# Patient Record
Sex: FEMALE | Race: WHITE | Employment: UNEMPLOYED | URBAN - METROPOLITAN AREA
[De-identification: names, ages, dates, MRNs, and addresses within clinical notes are randomized per-mention and may not be internally consistent; named-entity substitution may affect disease eponyms.]

---

## 2021-09-26 ENCOUNTER — HOSPITAL ENCOUNTER (EMERGENCY)
Age: 26
Discharge: HOME | End: 2021-09-26
Attending: EMERGENCY MEDICINE
Payer: COMMERCIAL

## 2021-09-26 ENCOUNTER — APPOINTMENT (OUTPATIENT)
Dept: GENERAL RADIOLOGY | Age: 26
End: 2021-09-26
Payer: COMMERCIAL

## 2021-09-26 VITALS
HEIGHT: 59 IN | WEIGHT: 293 LBS | RESPIRATION RATE: 20 BRPM | BODY MASS INDEX: 59.07 KG/M2 | TEMPERATURE: 97.6 F | SYSTOLIC BLOOD PRESSURE: 138 MMHG | OXYGEN SATURATION: 96 % | HEART RATE: 98 BPM | DIASTOLIC BLOOD PRESSURE: 92 MMHG

## 2021-09-26 DIAGNOSIS — J45.901 MILD ASTHMA WITH EXACERBATION, UNSPECIFIED WHETHER PERSISTENT: Primary | ICD-10-CM

## 2021-09-26 LAB
ANION GAP SERPL CALCULATED.3IONS-SCNC: 17 MMOL/L (ref 4–16)
BASE EXCESS MIXED: 0.2 (ref 0–2.3)
BASE EXCESS: ABNORMAL (ref 0–2.4)
BASOPHILS ABSOLUTE: 0.1 K/CU MM
BASOPHILS RELATIVE PERCENT: 0.6 % (ref 0–1)
BUN BLDV-MCNC: 19 MG/DL (ref 6–23)
CALCIUM SERPL-MCNC: 9.4 MG/DL (ref 8.3–10.6)
CHLORIDE BLD-SCNC: 104 MMOL/L (ref 99–110)
CO2 CONTENT: 27.6 MMOL/L (ref 19–24)
CO2: 20 MMOL/L (ref 21–32)
CREAT SERPL-MCNC: 0.8 MG/DL (ref 0.6–1.1)
DIFFERENTIAL TYPE: ABNORMAL
EOSINOPHILS ABSOLUTE: 0.5 K/CU MM
EOSINOPHILS RELATIVE PERCENT: 6 % (ref 0–3)
GFR AFRICAN AMERICAN: >60 ML/MIN/1.73M2
GFR NON-AFRICAN AMERICAN: >60 ML/MIN/1.73M2
GLUCOSE BLD-MCNC: 100 MG/DL (ref 70–99)
HCO3 VENOUS: 26.2 MMOL/L (ref 19–25)
HCT VFR BLD CALC: 34.7 % (ref 37–47)
HEMOGLOBIN: 10.7 GM/DL (ref 12.5–16)
IMMATURE NEUTROPHIL %: 0.3 % (ref 0–0.43)
LYMPHOCYTES ABSOLUTE: 2.1 K/CU MM
LYMPHOCYTES RELATIVE PERCENT: 23.3 % (ref 24–44)
MCH RBC QN AUTO: 23.5 PG (ref 27–31)
MCHC RBC AUTO-ENTMCNC: 30.8 % (ref 32–36)
MCV RBC AUTO: 76.1 FL (ref 78–100)
MONOCYTES ABSOLUTE: 0.5 K/CU MM
MONOCYTES RELATIVE PERCENT: 5.2 % (ref 0–4)
O2 SAT, VEN: 47.3 % (ref 50–70)
PCO2, VEN: 47 MMHG (ref 38–52)
PDW BLD-RTO: 14.7 % (ref 11.7–14.9)
PH VENOUS: 7.35 (ref 7.32–7.42)
PLATELET # BLD: 293 K/CU MM (ref 140–440)
PMV BLD AUTO: 11 FL (ref 7.5–11.1)
PO2, VEN: 27.3 MMHG (ref 28–48)
POTASSIUM SERPL-SCNC: 3.7 MMOL/L (ref 3.5–5.1)
RBC # BLD: 4.56 M/CU MM (ref 4.2–5.4)
SEGMENTED NEUTROPHILS ABSOLUTE COUNT: 5.7 K/CU MM
SEGMENTED NEUTROPHILS RELATIVE PERCENT: 64.6 % (ref 36–66)
SODIUM BLD-SCNC: 141 MMOL/L (ref 135–145)
SOURCE, BLOOD GAS: ABNORMAL
TOTAL IMMATURE NEUTOROPHIL: 0.03 K/CU MM
WBC # BLD: 8.8 K/CU MM (ref 4–10.5)

## 2021-09-26 PROCEDURE — 6360000002 HC RX W HCPCS: Performed by: EMERGENCY MEDICINE

## 2021-09-26 PROCEDURE — 96375 TX/PRO/DX INJ NEW DRUG ADDON: CPT

## 2021-09-26 PROCEDURE — 93005 ELECTROCARDIOGRAM TRACING: CPT | Performed by: EMERGENCY MEDICINE

## 2021-09-26 PROCEDURE — 85025 COMPLETE CBC W/AUTO DIFF WBC: CPT

## 2021-09-26 PROCEDURE — 99283 EMERGENCY DEPT VISIT LOW MDM: CPT

## 2021-09-26 PROCEDURE — 96374 THER/PROPH/DIAG INJ IV PUSH: CPT

## 2021-09-26 PROCEDURE — 71045 X-RAY EXAM CHEST 1 VIEW: CPT

## 2021-09-26 PROCEDURE — 6370000000 HC RX 637 (ALT 250 FOR IP): Performed by: EMERGENCY MEDICINE

## 2021-09-26 PROCEDURE — 80048 BASIC METABOLIC PNL TOTAL CA: CPT

## 2021-09-26 RX ORDER — METHYLPREDNISOLONE SODIUM SUCCINATE 125 MG/2ML
125 INJECTION, POWDER, LYOPHILIZED, FOR SOLUTION INTRAMUSCULAR; INTRAVENOUS ONCE
Status: COMPLETED | OUTPATIENT
Start: 2021-09-26 | End: 2021-09-26

## 2021-09-26 RX ORDER — IPRATROPIUM BROMIDE AND ALBUTEROL SULFATE 2.5; .5 MG/3ML; MG/3ML
3 SOLUTION RESPIRATORY (INHALATION) ONCE
Status: COMPLETED | OUTPATIENT
Start: 2021-09-26 | End: 2021-09-26

## 2021-09-26 RX ORDER — ALBUTEROL SULFATE 2.5 MG/3ML
2.5 SOLUTION RESPIRATORY (INHALATION) EVERY 4 HOURS PRN
Qty: 120 EACH | Refills: 0 | Status: SHIPPED | OUTPATIENT
Start: 2021-09-26 | End: 2021-11-21 | Stop reason: SDUPTHER

## 2021-09-26 RX ORDER — PREDNISONE 50 MG/1
50 TABLET ORAL DAILY
Qty: 5 TABLET | Refills: 0 | Status: SHIPPED | OUTPATIENT
Start: 2021-09-26 | End: 2021-10-01

## 2021-09-26 RX ORDER — KETOROLAC TROMETHAMINE 30 MG/ML
15 INJECTION, SOLUTION INTRAMUSCULAR; INTRAVENOUS ONCE
Status: COMPLETED | OUTPATIENT
Start: 2021-09-26 | End: 2021-09-26

## 2021-09-26 RX ADMIN — KETOROLAC TROMETHAMINE 15 MG: 30 INJECTION, SOLUTION INTRAMUSCULAR; INTRAVENOUS at 20:08

## 2021-09-26 RX ADMIN — IPRATROPIUM BROMIDE AND ALBUTEROL SULFATE 3 AMPULE: .5; 3 SOLUTION RESPIRATORY (INHALATION) at 20:13

## 2021-09-26 RX ADMIN — METHYLPREDNISOLONE SODIUM SUCCINATE 125 MG: 125 INJECTION, POWDER, FOR SOLUTION INTRAMUSCULAR; INTRAVENOUS at 20:07

## 2021-09-26 ASSESSMENT — ENCOUNTER SYMPTOMS
SINUS PRESSURE: 0
SHORTNESS OF BREATH: 1
COUGH: 1
NAUSEA: 0
WHEEZING: 1
VOMITING: 0
RHINORRHEA: 0
CONSTIPATION: 0
ABDOMINAL PAIN: 0
SORE THROAT: 0
DIARRHEA: 0

## 2021-09-26 ASSESSMENT — PAIN SCALES - GENERAL: PAINLEVEL_OUTOF10: 1

## 2021-09-26 NOTE — ED NOTES
Pt presents to ED with c/o shortness of breath that started around noon today. Pt states she was cleaning with bleach and pine sol and started to become short of breath. Pt states she has a hx of asthma and used her inhaler several times today with temporary relief.       Sarmad Perales RN  09/26/21 1931

## 2021-09-27 LAB
EKG ATRIAL RATE: 95 BPM
EKG DIAGNOSIS: NORMAL
EKG P AXIS: 54 DEGREES
EKG P-R INTERVAL: 164 MS
EKG Q-T INTERVAL: 358 MS
EKG QRS DURATION: 70 MS
EKG QTC CALCULATION (BAZETT): 449 MS
EKG R AXIS: 83 DEGREES
EKG T AXIS: 60 DEGREES
EKG VENTRICULAR RATE: 95 BPM

## 2021-09-27 PROCEDURE — 93010 ELECTROCARDIOGRAM REPORT: CPT | Performed by: INTERNAL MEDICINE

## 2021-09-27 NOTE — ED PROVIDER NOTES
Emergency Department Encounter    Patient: Dahlia Nayak  MRN: [id number]  : 1995  Date of Evaluation: 2021  ED Provider:  Roger Wilkes DO    Triage Chief Complaint:   Shortness of Breath    HPI:  Dahlia Nayak is a 27 y.o. female with past medical history significant for asthma who presents with shortness of breath. Patient states that she had a  leak in her home, today she was cleaning up after this and began having shortness of breath shortly after using cleaning chemicals. She stated that she used diluted bleach and Pine-Sol in a room with a window open, she did not wear a mask while cleaning. Patient states she began having wheezing, and difficulty breathing that has gotten progressively worse throughout today. She does note that is associated with a nonproductive cough. She has tried her albuterol inhaler without improvement of symptoms. Denies F/C, CP, SOB, palpitations, N/V, abdominal pain, dysuria, diarrhea and constipatin. ROS:  Review of Systems   Constitutional: Negative for chills and fever. HENT: Negative for congestion, rhinorrhea, sinus pressure and sore throat. Eyes: Negative for visual disturbance. Respiratory: Positive for cough, shortness of breath and wheezing. Cardiovascular: Negative for chest pain and palpitations. Gastrointestinal: Negative for abdominal pain, constipation, diarrhea, nausea and vomiting. Genitourinary: Negative for dysuria, frequency and urgency. Musculoskeletal: Negative for arthralgias and myalgias. Skin: Negative for rash. Neurological: Negative for dizziness and syncope. Psychiatric/Behavioral: Negative for agitation, behavioral problems and confusion. Past Medical History:   Diagnosis Date    Asthma      No past surgical history on file. No family history on file.   Social History     Socioeconomic History    Marital status: Single     Spouse name: Not on file    Number of children: Not on file    Years of education: Not on file    Highest education level: Not on file   Occupational History    Not on file   Tobacco Use    Smoking status: Never Smoker    Smokeless tobacco: Never Used   Vaping Use    Vaping Use: Never used   Substance and Sexual Activity    Alcohol use: Never    Drug use: Never    Sexual activity: Not on file   Other Topics Concern    Not on file   Social History Narrative    Not on file     Social Determinants of Health     Financial Resource Strain:     Difficulty of Paying Living Expenses:    Food Insecurity:     Worried About Running Out of Food in the Last Year:     [address] in the Last Year:    Transportation Needs:     Lack of Transportation (Medical):  Lack of Transportation (Non-Medical):    Physical Activity:     Days of Exercise per Week:     Minutes of Exercise per Session:    Stress:     Feeling of Stress :    Social Connections:     Frequency of Communication with Friends and Family:     Frequency of Social Gatherings with Friends and Family:     Attends Spiritism Services:     Active Member of Clubs or Organizations:     Attends Club or Organization Meetings:     Marital Status:    Intimate Partner Violence:     Fear of Current or Ex-Partner:     Emotionally Abused:     Physically Abused:     Sexually Abused:      No current facility-administered medications for this encounter. No current outpatient medications on file.      Allergies   Allergen Reactions    Amoxicillin        Nursing Notes Reviewed    Physical Exam:  Triage VS:    ED Triage Vitals   Enc Vitals Group      BP 09/26/21 1927 (!) 138/94      Pulse 09/26/21 1927 91      Resp 09/26/21 1927 20      Temp 09/26/21 1927 97.6 °F (36.4 °C)      Temp Source 09/26/21 1927 Oral      SpO2 09/26/21 1927 98 %      Weight 09/26/21 1929 (!) 522 lb (236.8 kg)      Height 09/26/21 1927 4' 11\" (1.499 m)      Head Circumference --       Peak Flow --       Pain Score --       Pain Loc -- Pain Edu? --       Excl. in [address]? --      Physical Exam  Vitals and nursing note reviewed. Constitutional:       Appearance: Normal appearance. HENT:      Head: Normocephalic and atraumatic. Nose: Nose normal.      Mouth/Throat:      Mouth: Mucous membranes are moist.   Eyes:      Conjunctiva/sclera: Conjunctivae normal.   Cardiovascular:      Rate and Rhythm: Normal rate and regular rhythm. Pulses: Normal pulses. Pulmonary:      Effort: Pulmonary effort is normal. No respiratory distress. Breath sounds: Decreased air movement present. Wheezing present. No rhonchi. Comments: Increased work of breathing, speaking full sentences, no respiratory distress  Abdominal:      General: Abdomen is flat. Bowel sounds are normal. There is no distension. Palpations: Abdomen is soft. Tenderness: There is no abdominal tenderness. There is no guarding or rebound. Musculoskeletal:         General: Normal range of motion. Skin:     General: Skin is warm. Capillary Refill: Capillary refill takes less than 2 seconds. Neurological:      Mental Status: She is alert and oriented to person, place, and time. Mental status is at baseline.    Psychiatric:         Mood and Affect: Mood normal.              I have reviewed and interpreted all of the currently available lab results from this visit (if applicable):  Results for orders placed or performed during the hospital encounter of 26/77/23   Basic Metabolic Panel w/ Reflex to MG   Result Value Ref Range    Sodium 141 135 - 145 MMOL/L    Potassium 3.7 3.5 - 5.1 MMOL/L    Chloride 104 99 - 110 mMol/L    CO2 20 (L) 21 - 32 MMOL/L    Anion Gap 17 (H) 4 - 16    BUN 19 6 - 23 MG/DL    CREATININE 0.8 0.6 - 1.1 MG/DL    Glucose 100 (H) 70 - 99 MG/DL    Calcium 9.4 8.3 - 10.6 MG/DL    GFR Non-African American >60 >60 mL/min/1.73m2    GFR African American >60 >60 mL/min/1.73m2   CBC Auto Differential   Result Value Ref Range    WBC 8.8 4.0 - 10.5 K/CU MM    RBC 4.56 4.2 - 5.4 M/CU MM    Hemoglobin 10.7 (L) 12.5 - 16.0 GM/DL    Hematocrit 34.7 (L) 37 - 47 %    MCV 76.1 (L) 78 - 100 FL    MCH 23.5 (L) 27 - 31 PG    MCHC 30.8 (L) 32.0 - 36.0 %    RDW 14.7 11.7 - 14.9 %    Platelets 530 417 - 522 K/CU MM    MPV 11.0 7.5 - 11.1 FL    Differential Type AUTOMATED DIFFERENTIAL     Segs Relative 64.6 36 - 66 %    Lymphocytes % 23.3 (L) 24 - 44 %    Monocytes % 5.2 (H) 0 - 4 %    Eosinophils % 6.0 (H) 0 - 3 %    Basophils % 0.6 0 - 1 %    Segs Absolute 5.7 K/CU MM    Lymphocytes Absolute 2.1 K/CU MM    Monocytes Absolute 0.5 K/CU MM    Eosinophils Absolute 0.5 K/CU MM    Basophils Absolute 0.1 K/CU MM    Immature Neutrophil % 0.3 0 - 0.43 %    Total Immature Neutrophil 0.03 K/CU MM   POCT Venous   Result Value Ref Range    pH, Avinash 7.35 7.32 - 7.42    pCO2, Avinash 47.0 38 - 52 mmHG    pO2, Avinash 27.3 (L) 28 - 48 mmHG    Base Exc, Mixed 0.2 0 - 2.3    Base Excess HIDE 0 - 2.4    HCO3, Venous 26.2 (H) 19 - 25 MMOL/L    O2 Sat, Avinash 47.3 (L) 50 - 70 %    CO2 Content 27.6 (H) 19 - 24 MMOL/L    Source: Venous       Radiographs (if obtained):    [] Radiologist's Report Reviewed:  XR CHEST PORTABLE   Final Result   No acute process. EKG (if obtained): (All EKG's are interpreted by myself in the absence of a cardiologist)  Normal sinus rhythm, rate 95, , QRS 70, QTc 449, no acute ST elevation. Chart review shows recent radiographs:  No results found. MDM:  Patient seen and examined. Chest x-ray no acute disease. CBC with mild anemia, MCV is low, this is most likely chronic. Most likely noncontributory her shortness of breath. Patient is shortness of breath is most likely asthma exacerbation secondary to chemicals she was cleaning with today, diluted bleach and Pine-Sol. Patient did have improvement of symptoms here in the ED following DuoNeb's.   I did  patient that she should clean in a well ventilated room, and when using chemicals she should be wearing a mask. Patient given prednisone for the next 5 days for home. She did ask for refills of her nebulized albuterol. These are provided. Patient to follow-up with her primary care provider in 1 to 2 days, return to ED if symptoms worsen. All questions are answered, she is in agreement plan of care. 8:56 PM  Recheck of patient. She is breathing more comfortably. She is no longer tachypneic. She is 99% on room air. On auscultation she no longer has wheezes, and has improved air movement. States she is feeling better. CRITICAL CARE NOTE:  There was a high probability of clinically significant life-threatening deterioration of the patient's condition requiring my urgent intervention due to shortness of breath. Ordering interpretation of labs and imaging. Was performed to address this. Total critical care time is AT LEAST 20 minutes. This includes vital sign monitoring, pulse oximetry monitoring, telemetry monitoring, clinical response to the IV medications, reviewing the nursing notes, consultation time, dictation/documentation time, and interpretation of the lab work. This time excludes time spent performing procedures and separately billable procedures and family discussion time. Clinical Impression:  1.  Mild asthma with exacerbation, unspecified whether persistent      Disposition referral (if applicable):  Carin Wade MD  [place] [address] [phone]    Schedule an appointment as soon as possible for a visit in 2 days      [facility] [address]  [phone]  Go to   As needed, If symptoms worsen    Disposition medications (if applicable):  New Prescriptions    ALBUTEROL (PROVENTIL) (2.5 MG/3ML) 0.083% NEBULIZER SOLUTION    Take 3 mLs by nebulization every 4 hours as needed for Wheezing    PREDNISONE (DELTASONE) 50 MG TABLET    Take 1 tablet by mouth daily for 5 days       Comment: Please note this report has been produced using speech recognition software and may contain errors related to that system including errors in grammar, punctuation, and spelling, as well as words and phrases that may be inappropriate. Efforts were made to edit the dictations.        [id number] [facility],   09/26/21 3113

## 2021-10-10 ENCOUNTER — HOSPITAL ENCOUNTER (EMERGENCY)
Age: 26
Discharge: HOME | End: 2021-10-10
Attending: EMERGENCY MEDICINE
Payer: COMMERCIAL

## 2021-10-10 ENCOUNTER — APPOINTMENT (OUTPATIENT)
Dept: GENERAL RADIOLOGY | Age: 26
End: 2021-10-10
Payer: COMMERCIAL

## 2021-10-10 VITALS
RESPIRATION RATE: 24 BRPM | DIASTOLIC BLOOD PRESSURE: 93 MMHG | SYSTOLIC BLOOD PRESSURE: 149 MMHG | WEIGHT: 261.8 LBS | HEIGHT: 59 IN | OXYGEN SATURATION: 100 % | BODY MASS INDEX: 52.78 KG/M2 | HEART RATE: 103 BPM | TEMPERATURE: 97.7 F

## 2021-10-10 DIAGNOSIS — J06.9 ACUTE UPPER RESPIRATORY INFECTION: ICD-10-CM

## 2021-10-10 DIAGNOSIS — J45.41 MODERATE PERSISTENT ASTHMA WITH EXACERBATION: Primary | ICD-10-CM

## 2021-10-10 LAB — CARBON MONOXIDE, BLOOD: 3.4 % (ref 0–5)

## 2021-10-10 PROCEDURE — 82375 ASSAY CARBOXYHB QUANT: CPT

## 2021-10-10 PROCEDURE — 6370000000 HC RX 637 (ALT 250 FOR IP): Performed by: EMERGENCY MEDICINE

## 2021-10-10 PROCEDURE — 71046 X-RAY EXAM CHEST 2 VIEWS: CPT

## 2021-10-10 PROCEDURE — 99284 EMERGENCY DEPT VISIT MOD MDM: CPT

## 2021-10-10 RX ORDER — PREDNISONE 50 MG/1
50 TABLET ORAL DAILY
Qty: 5 TABLET | Refills: 0 | Status: SHIPPED | OUTPATIENT
Start: 2021-10-10 | End: 2021-10-15

## 2021-10-10 RX ORDER — IPRATROPIUM BROMIDE AND ALBUTEROL SULFATE 2.5; .5 MG/3ML; MG/3ML
3 SOLUTION RESPIRATORY (INHALATION) ONCE
Status: COMPLETED | OUTPATIENT
Start: 2021-10-10 | End: 2021-10-10

## 2021-10-10 RX ORDER — AZITHROMYCIN 250 MG/1
TABLET, FILM COATED ORAL
Qty: 1 PACKET | Refills: 0 | Status: SHIPPED | OUTPATIENT
Start: 2021-10-10 | End: 2021-10-14

## 2021-10-10 RX ORDER — PREDNISONE 20 MG/1
60 TABLET ORAL ONCE
Status: COMPLETED | OUTPATIENT
Start: 2021-10-10 | End: 2021-10-10

## 2021-10-10 RX ORDER — GUAIFENESIN 100 MG/5ML
200 SOLUTION ORAL EVERY 4 HOURS PRN
Qty: 1200 ML | Refills: 0 | Status: SHIPPED | OUTPATIENT
Start: 2021-10-10 | End: 2021-11-21 | Stop reason: SDUPTHER

## 2021-10-10 RX ADMIN — IPRATROPIUM BROMIDE AND ALBUTEROL SULFATE 3 AMPULE: .5; 2.5 SOLUTION RESPIRATORY (INHALATION) at 01:42

## 2021-10-10 RX ADMIN — PREDNISONE 60 MG: 20 TABLET ORAL at 01:42

## 2021-10-10 NOTE — ED PROVIDER NOTES
EMERGENCY DEPARTMENT ENCOUNTER      CHIEF COMPLAINT:   Cough  Shortness of breath    HPI: Rudy Benz is a 27 y.o. female who presents to the emergency department, complaining of a cough, wheezing and shortness of breath. The patient has a history of asthma. She states that she has been having a flareup for the past 2 weeks. She was seen here 2 weeks ago and was prescribed steroids and breathing treatments. She has been taking them without much relief. She states that she continues to wheeze frequently. She has a cough productive for yellow sputum. Symptoms are intermittent. There are no exacerbating or alleviating factors. She also states that her gas stove was on for several hours tonight leaking gas and she did not realize it. She does not know if this is contributed to her symptoms. She denies a history of DVT or PE. She denies fevers, chills, hemoptysis, leg pain, leg swelling or any other complaints. REVIEW OF SYSTEMS:   Constitutional:  Denies fever or chills  Eyes:  Denies change in visual acuity  HENT: See HPI  Respiratory: See HPI  Cardiovascular:  Denies chest pain or edema  GI:  Denies abdominal pain, nausea, vomiting, bloody stools or diarrhea  :  Denies dysuria  Musculoskeletal:  Denies back pain or joint pain  Integument:  Denies rash  Neurologic:  Denies headache, focal weakness or sensory changes  \"Remaining review of systems reviewed and negative. I have reviewed the nursing triage documentation and agree unless otherwise noted below. \"      PAST MEDICAL HISTORY:   Past Medical History:   Diagnosis Date    Asthma        CURRENT MEDICATIONS:   Home medications reviewed. SURGICAL HISTORY:   History reviewed. No pertinent surgical history. FAMILY HISTORY:   No family history on file.     SOCIAL HISTORY:   Social History     Socioeconomic History    Marital status: Single     Spouse name: Not on file    Number of children: Not on file    Years of education: Not on file  Highest education level: Not on file   Occupational History    Not on file   Tobacco Use    Smoking status: Never Smoker    Smokeless tobacco: Never Used   Vaping Use    Vaping Use: Never used   Substance and Sexual Activity    Alcohol use: Never    Drug use: Never    Sexual activity: Not on file   Other Topics Concern    Not on file   Social History Narrative    Not on file     Social Determinants of Health     Financial Resource Strain:     Difficulty of Paying Living Expenses:    Food Insecurity:     Worried About Running Out of Food in the Last Year:     [address] in the Last Year:    Transportation Needs:     Lack of Transportation (Medical):  Lack of Transportation (Non-Medical):    Physical Activity:     Days of Exercise per Week:     Minutes of Exercise per Session:    Stress:     Feeling of Stress :    Social Connections:     Frequency of Communication with Friends and Family:     Frequency of Social Gatherings with Friends and Family:     Attends Jehovah's witness Services:     Active Member of Clubs or Organizations:     Attends Club or Organization Meetings:     Marital Status:    Intimate Partner Violence:     Fear of Current or Ex-Partner:     Emotionally Abused:     Physically Abused:     Sexually Abused: ALLERGIES: Amoxicillin    PHYSICAL EXAM:  VITAL SIGNS:   ED Triage Vitals   Enc Vitals Group      BP 10/10/21 0144 (!) 149/93      Pulse 10/10/21 0125 109      Resp 10/10/21 0125 22      Temp 10/10/21 0125 97.7 °F (36.5 °C)      Temp Source 10/10/21 0125 Infrared      SpO2 10/10/21 0125 97 %      Weight 10/10/21 0144 261 lb 12.8 oz (118.8 kg)      Height 10/10/21 0144 4' 11\" (1.499 m)      Head Circumference --       Peak Flow --       Pain Score --       Pain Loc --       Pain Edu? --       Excl.  in [address]? --      Constitutional:  Non-toxic appearance  HENT: Normocephalic, Atraumatic, Bilateral external ears normal, Oropharynx moist, No oral exudates, Nose normal.  Eyes:  PERRL, Conjunctiva normal, No discharge. Neck: Normal range of motion, No tenderness, Supple, No stridor, No lymphadenopathy . Cardiovascular: Mildly tachycardic, regular rhythm  Pulmonary/Chest: Good air entry bilaterally, mild respiratory distress, diffuse wheezing, frequent cough  Abdomen: Bowel sounds normal, Soft, No tenderness, No masses, No pulsatile masses  Extremities:  Normal range of motion, Intact distal pulses, No edema, No tenderness  Skin:  Warm, Dry, No erythema, No rash      EKG Interpretation  None    Radiology / Procedures:  XR CHEST (2 VW) (Final result)  Result time 10/10/21 02:46:57  Final result by Shantel Singh MD (10/10/21 02:46:57)                Impression:    Bibasilar atelectasis.  No focal consolidation. Narrative:    EXAMINATION:   TWO XRAY VIEWS OF THE CHEST     10/10/2021 1:43 am     COMPARISON:   Chest radiograph dated September 26, 2021     HISTORY:   ORDERING SYSTEM PROVIDED HISTORY: Cough   TECHNOLOGIST PROVIDED HISTORY:   Reason for exam:->Cough   Reason for Exam: shortness of breath   Acuity: Acute   Type of Exam: Initial     FINDINGS:   Bibasilar atelectasis is noted.  The cardiomediastinal silhouette is stable. There is no focal consolidation, pleural effusion, or pneumothorax. ED COURSE & MEDICAL DECISION MAKING:  Pertinent Labs & Imaging studies reviewed. (See chart for details)  On exam, the patient is afebrile and nontoxic appearing. She is mildly tachycardic. She is otherwise hemodynamically stable and neurologically intact. Carbon monoxide level is normal.  Chest x-ray shows bibasilar atelectasis with no focal consolidation. The patient was treated with prednisone and a series of breathing treatments. She had some improvement. I suspect that the patient has a persistent asthma exacerbation. She also has an upper respiratory infection. She may have pneumonia not visualized on chest x-ray.   Her symptoms have been going on for 2 weeks and so I think a course of antibiotics is reasonable at this time. . I have a low suspicion for peritonsillar abscess, epiglottitis, Bruce's angina, impending respiratory failure or sepsis. I feel that the patient is stable for outpatient management with follow up in 2-3 days. The patient is given return precautions. The patient verbalized understanding, was agreeable with plan, and the patient was discharged home in stable condition. Clinical Impression:  1. Moderate persistent asthma with exacerbation    2. Acute upper respiratory infection        Disposition referral (if applicable): Your primary care physician    Schedule an appointment as soon as possible for a visit in 2 days      [place] [address] [address]  [phone]  Go to   If symptoms worsen      Disposition medications (if applicable):  Discharge Medication List as of 10/10/2021  3:08 AM      START taking these medications    Details   predniSONE (DELTASONE) 50 MG tablet Take 1 tablet by mouth daily for 5 days, Disp-5 tablet, R-0Normal      azithromycin (ZITHROMAX Z-RADHA) 250 MG tablet Take 2 tablets (500 mg) on Day 1, and then take 1 tablet (250 mg) on days 2 through 5., Disp-1 packet, R-0Normal      guaiFENesin (ROBITUSSIN) 100 MG/5ML SOLN oral solution Take 10 mLs by mouth every 4 hours as needed for Cough, Disp-1200 mL, R-0Normal               Comment: Please note this report has been produced using speech recognition software and may contain errors related to that system including errors in grammar, punctuation, and spelling, as well as words and phrases that may be inappropriate. If there are any questions or concerns please feel free to contact the dictating provider for clarification.                 Kevin Chaudhry MD  10/10/21 3882

## 2021-11-21 ENCOUNTER — APPOINTMENT (OUTPATIENT)
Dept: GENERAL RADIOLOGY | Age: 26
End: 2021-11-21
Payer: COMMERCIAL

## 2021-11-21 ENCOUNTER — HOSPITAL ENCOUNTER (EMERGENCY)
Age: 26
Discharge: HOME | End: 2021-11-21
Attending: EMERGENCY MEDICINE
Payer: COMMERCIAL

## 2021-11-21 VITALS
RESPIRATION RATE: 19 BRPM | OXYGEN SATURATION: 93 % | HEART RATE: 103 BPM | HEIGHT: 59 IN | SYSTOLIC BLOOD PRESSURE: 136 MMHG | TEMPERATURE: 98.5 F | WEIGHT: 250 LBS | BODY MASS INDEX: 50.4 KG/M2 | DIASTOLIC BLOOD PRESSURE: 75 MMHG

## 2021-11-21 DIAGNOSIS — Z20.822 PERSON UNDER INVESTIGATION FOR COVID-19: ICD-10-CM

## 2021-11-21 DIAGNOSIS — J45.21 MILD INTERMITTENT ASTHMA WITH EXACERBATION: Primary | ICD-10-CM

## 2021-11-21 DIAGNOSIS — E66.01 MORBID OBESITY WITH BMI OF 50.0-59.9, ADULT (HCC): ICD-10-CM

## 2021-11-21 PROCEDURE — 71046 X-RAY EXAM CHEST 2 VIEWS: CPT

## 2021-11-21 PROCEDURE — 6370000000 HC RX 637 (ALT 250 FOR IP): Performed by: EMERGENCY MEDICINE

## 2021-11-21 PROCEDURE — 99284 EMERGENCY DEPT VISIT MOD MDM: CPT

## 2021-11-21 PROCEDURE — 93005 ELECTROCARDIOGRAM TRACING: CPT | Performed by: EMERGENCY MEDICINE

## 2021-11-21 RX ORDER — PREDNISONE 20 MG/1
60 TABLET ORAL ONCE
Status: COMPLETED | OUTPATIENT
Start: 2021-11-21 | End: 2021-11-21

## 2021-11-21 RX ORDER — ALBUTEROL SULFATE 2.5 MG/3ML
2.5 SOLUTION RESPIRATORY (INHALATION) EVERY 6 HOURS PRN
Qty: 120 EACH | Refills: 1 | Status: SHIPPED | OUTPATIENT
Start: 2021-11-21

## 2021-11-21 RX ORDER — IPRATROPIUM BROMIDE AND ALBUTEROL SULFATE 2.5; .5 MG/3ML; MG/3ML
1 SOLUTION RESPIRATORY (INHALATION) ONCE
Status: COMPLETED | OUTPATIENT
Start: 2021-11-21 | End: 2021-11-21

## 2021-11-21 RX ORDER — GUAIFENESIN 100 MG/5ML
200 SOLUTION ORAL EVERY 6 HOURS PRN
Qty: 1200 ML | Refills: 0 | Status: SHIPPED | OUTPATIENT
Start: 2021-11-21

## 2021-11-21 RX ORDER — ALBUTEROL SULFATE 90 UG/1
2 AEROSOL, METERED RESPIRATORY (INHALATION) 4 TIMES DAILY PRN
Qty: 18 G | Refills: 0 | Status: SHIPPED | OUTPATIENT
Start: 2021-11-21

## 2021-11-21 RX ORDER — PREDNISONE 20 MG/1
20 TABLET ORAL DAILY
Qty: 5 TABLET | Refills: 0 | Status: SHIPPED | OUTPATIENT
Start: 2021-11-21 | End: 2021-11-26

## 2021-11-21 RX ADMIN — IPRATROPIUM BROMIDE AND ALBUTEROL SULFATE 1 AMPULE: .5; 3 SOLUTION RESPIRATORY (INHALATION) at 12:53

## 2021-11-21 RX ADMIN — PREDNISONE 60 MG: 20 TABLET ORAL at 12:53

## 2021-11-21 ASSESSMENT — PAIN DESCRIPTION - DESCRIPTORS: DESCRIPTORS: ACHING

## 2021-11-21 ASSESSMENT — PAIN SCALES - GENERAL: PAINLEVEL_OUTOF10: 5

## 2021-11-21 ASSESSMENT — PAIN DESCRIPTION - PAIN TYPE: TYPE: ACUTE PAIN

## 2021-11-21 ASSESSMENT — PAIN DESCRIPTION - LOCATION: LOCATION: BACK

## 2021-11-21 ASSESSMENT — PAIN DESCRIPTION - ORIENTATION: ORIENTATION: RIGHT

## 2021-11-21 NOTE — ED TRIAGE NOTES
Pt from home states SOB started this morning, doesn't feel like her normal asthma SOB. And her inhalers did not help relieve the feeling. Pain in posterior R lung per pt.

## 2021-11-21 NOTE — Clinical Note
Cristopher Velásquez was seen and treated in our emergency department on 11/21/2021. She may return to work on 11/24/2021. If you have any questions or concerns, please don't hesitate to call.       Yudi Langford MD

## 2021-11-21 NOTE — ED NOTES
Lung sounds showed some expiratory crackles bilateral with no wheezing or rhonchi noted     Angel Ceron RN  11/21/21 5782

## 2021-11-21 NOTE — ED PROVIDER NOTES
Emergency Department Encounter  [address]    Patient: Rico Crowell  MRN: [id number]  : 1995  Date of Evaluation: 2021  ED Provider: Dave Emery MD    Chief Complaint       Chief Complaint   Patient presents with    Shortness of Breath     Since this morning     Claude Dubin is a 27 y.o. female who presents to the emergency department with reports she feels like she is having trouble getting air into her lungs. She has had greenish sputum this morning when she coughs. The cough started yesterday. There is no fever. She denies any pain. ROS:     At least 10 systems reviewed and otherwise acutely negative except as in the [address] Past History     Past Medical History:   Diagnosis Date    Asthma      History reviewed. No pertinent surgical history. Social History     Socioeconomic History    Marital status: Single     Spouse name: None    Number of children: None    Years of education: None    Highest education level: None   Occupational History    None   Tobacco Use    Smoking status: Never Smoker    Smokeless tobacco: Never Used   Vaping Use    Vaping Use: Never used   Substance and Sexual Activity    Alcohol use: Never    Drug use: Never    Sexual activity: None   Other Topics Concern    None   Social History Narrative    None     Social Determinants of Health     Financial Resource Strain:     Difficulty of Paying Living Expenses: Not on file   Food Insecurity:     Worried About Running Out of Food in the Last Year: Not on file    [place] of Food in the Last Year: Not on file   Transportation Needs:     Lack of Transportation (Medical): Not on file    Lack of Transportation (Non-Medical):  Not on file   Physical Activity:     Days of Exercise per Week: Not on file    Minutes of Exercise per Session: Not on file   Stress:     Feeling of Stress : Not on file   Social Connections:     Frequency of Communication with Friends and Family: Not on file    Frequency of Social Gatherings with Friends and Family: Not on file    Attends [employer] Services: Not on file    Active Member of Clubs or Organizations: Not on file    Attends Club or Organization Meetings: Not on file    Marital Status: Not on file   Intimate Partner Violence:     Fear of Current or Ex-Partner: Not on file    Emotionally Abused: Not on file    Physically Abused: Not on file    Sexually Abused: Not on file   Housing Stability:     Unable to Pay for Housing in the Last Year: Not on file    Number of [place] in the Last Year: Not on file    Unstable Housing in the Last Year: Not on file       Medications/Allergies     Discharge Medication List as of 11/21/2021  1:50 PM        Allergies   Allergen Reactions    Amoxicillin         Physical Exam       ED Triage Vitals   BP Temp Temp Source Pulse Resp SpO2 Height Weight   11/21/21 1209 11/21/21 1212 11/21/21 1209 11/21/21 1209 11/21/21 1209 11/21/21 1209 11/21/21 1209 11/21/21 1209   136/75 98.5 °F (36.9 °C) Oral 103 19 93 % 4' 11\" (1.499 m) 250 lb (113.4 kg)     GENERAL APPEARANCE: Awake and alert. Cooperative. No acute distress. No obvious discomfort. HEAD: Normocephalic. Atraumatic. EYES: Sclera anicteric. PERRL. EOMI.  ENT: Tolerates saliva. No trismus. The oropharynx and oral cavity unremarkable. There is no swelling, erythema, exudate. NECK: Supple. Trachea midline. No audible stridor or carotid bruit. CARDIO: RRR. Radial pulse 2+. No audible murmur. LUNGS: Respirations unlabored. Coarse bilateral breath sounds with mild decrease in air movement. ABDOMEN: Soft. Non-distended. Non-tender. Rotund with normoactive bowel sounds. EXTREMITIES: No acute deformities. No clubbing, edema, cyanosis. The distal pulses are intact and symmetrical.  The capillary bed refill is brisk. SKIN: Warm and dry. No obvious lesions or discolorations. NEUROLOGICAL: No gross facial drooping.  Moves all 4 extremities spontaneously. Awake and alert. GCS 15. No gross motor or sensory deficits. Cranial nerves III to XII grossly intact. PSYCHIATRIC: Normal mood. Diagnostics   Labs:  Results for orders placed or performed during the hospital encounter of 11/21/21   EKG 12 Lead   Result Value Ref Range    Ventricular Rate 94 BPM    Atrial Rate 94 BPM    P-R Interval 140 ms    QRS Duration 92 ms    Q-T Interval 332 ms    QTc Calculation (Bazett) 415 ms    P Axis 51 degrees    R Axis 78 degrees    T Axis 35 degrees    Diagnosis       Normal sinus rhythm with sinus arrhythmia  Normal ECG  When compared with ECG of 26-SEP-2021 19:38,  No significant change was found    Confirmed by [facility] LANDA ([id number]) on 11/22/2021 1:23:13 PM       Radiographs:    EXAMINATION:   TWO XRAY VIEWS OF THE CHEST       11/21/2021 12:46 pm       COMPARISON:   10/10/2021       HISTORY:   ORDERING SYSTEM PROVIDED HISTORY: cough with grey sputum, H/O Asthma   TECHNOLOGIST PROVIDED HISTORY:   Reason for exam:->cough with grey sputum, H/O Asthma   Reason for Exam: cough with grey sputum, H/O Asthma   Acuity: Acute   Type of Exam: Initial       FINDINGS:   The lungs are without acute focal process.  There is no effusion or   pneumothorax. The cardiomediastinal silhouette is without acute process. The   osseous structures are without acute process.           Impression   No acute process. Procedures/EKG:   EKG Interpretation    Interpreted by emergency department physician    Rhythm: normal sinus   Rate: 94 bpm  Axis: Normal  Ectopy: none  Conduction: normal  ST Segments: nonspecific changes  T Waves: non specific changes  Q Waves: none    Clinical Impression: Normal sinus rhythm with no acute changes.   No significant change when compared to previous twelve-lead EKG from 9/26/2021    Lyric Walsh MD      ED Course and MDM   In brief, Tyler Barth is a 27 y.o. female who presented to the emergency department with report of shortness of breath at home. The patient has had a cough with greenish sputum. Chest x-ray demonstrated no acute cardiopulmonary process. The patient was treated with prednisone and albuterol nebulization treatment with improvement of her symptoms subjectively. Breath sounds improved with good air movement. The patient is to get outpatient COVID-19 test.  She is to quarantine until she has a confirmed negative COVID-19 test.  The patient is to follow-up with her primary care provider. She is to work with her primary care provider for weight management. Patient's encouraged stay well-hydrated. She is encouraged to return to the emergency department for any worsening or concerning symptoms. At disposition the patient is stable, improved, and in no acute distress or obvious discomfort. ED Medication Orders (From admission, onward)    Start Ordered     Status Ordering Provider    11/21/21 1245 11/21/21 1243  predniSONE (DELTASONE) tablet 60 mg  ONCE         Last MAR action: Given - by Nils KIRBY on 11/21/21 at [facility], [address] [place] [address]    11/21/21 1245 11/21/21 1243  ipratropium-albuterol (DUONEB) nebulizer solution 1 ampule  ONCE        Question:  Initiate RT Bronchodilator Protocol  Answer:  Yes    Last MAR action: Given - by Terence Nolen on 11/21/21 at [facility], [address] [place] [address]          Final Impression      1. Mild intermittent asthma with exacerbation    2. Person under investigation for COVID-19    3.  Morbid obesity with BMI of 50.0-59.9, adult [facility])      DISPOSITION Decision To Discharge 11/21/2021 01:44:31 PM     (Please note that portions of this note may have been completed with a voice recognition program. Efforts were made to edit the dictations but occasionally words are mis-transcribed.)    Guille Fritz MD  [address], [place]  11/22/21 1510

## 2021-11-21 NOTE — ED NOTES
The patient presents to the emergency department alert and oriented with a complaint of shortness of breath since this am. The patient states \" My lungs hurt\". The patient placed on the monitor with vital signs taken. Assessment as follows; Skin is pink, warm and dry.  Lung sounds are clear with expiratory crackles noted bilateral. The patient has a history of asthma and takes a nebulizer at home without relief     Jackie Barton RN  11/21/21 5061

## 2021-11-22 LAB
EKG ATRIAL RATE: 94 BPM
EKG DIAGNOSIS: NORMAL
EKG P AXIS: 51 DEGREES
EKG P-R INTERVAL: 140 MS
EKG Q-T INTERVAL: 332 MS
EKG QRS DURATION: 92 MS
EKG QTC CALCULATION (BAZETT): 415 MS
EKG R AXIS: 78 DEGREES
EKG T AXIS: 35 DEGREES
EKG VENTRICULAR RATE: 94 BPM

## 2021-11-22 PROCEDURE — 93010 ELECTROCARDIOGRAM REPORT: CPT | Performed by: INTERNAL MEDICINE

## 2022-10-10 ENCOUNTER — APPOINTMENT (OUTPATIENT)
Dept: GENERAL RADIOLOGY | Age: 27
End: 2022-10-10
Payer: COMMERCIAL

## 2022-10-10 ENCOUNTER — HOSPITAL ENCOUNTER (EMERGENCY)
Age: 27
Discharge: HOME | End: 2022-10-10
Attending: EMERGENCY MEDICINE
Payer: COMMERCIAL

## 2022-10-10 VITALS
RESPIRATION RATE: 16 BRPM | HEIGHT: 59 IN | DIASTOLIC BLOOD PRESSURE: 76 MMHG | TEMPERATURE: 97.6 F | HEART RATE: 93 BPM | OXYGEN SATURATION: 99 % | BODY MASS INDEX: 39.92 KG/M2 | WEIGHT: 198 LBS | SYSTOLIC BLOOD PRESSURE: 138 MMHG

## 2022-10-10 DIAGNOSIS — S63.617A SPRAIN OF LEFT LITTLE FINGER, UNSPECIFIED SITE OF DIGIT, INITIAL ENCOUNTER: Primary | ICD-10-CM

## 2022-10-10 PROCEDURE — 6370000000 HC RX 637 (ALT 250 FOR IP): Performed by: EMERGENCY MEDICINE

## 2022-10-10 PROCEDURE — 73130 X-RAY EXAM OF HAND: CPT

## 2022-10-10 PROCEDURE — 99283 EMERGENCY DEPT VISIT LOW MDM: CPT

## 2022-10-10 RX ORDER — ACETAMINOPHEN 500 MG
1000 TABLET ORAL
Status: COMPLETED | OUTPATIENT
Start: 2022-10-10 | End: 2022-10-10

## 2022-10-10 RX ADMIN — ACETAMINOPHEN 1000 MG: 500 TABLET ORAL at 20:28

## 2022-10-10 ASSESSMENT — PAIN SCALES - GENERAL
PAINLEVEL_OUTOF10: 3
PAINLEVEL_OUTOF10: 4

## 2022-10-10 ASSESSMENT — LIFESTYLE VARIABLES
HOW OFTEN DO YOU HAVE A DRINK CONTAINING ALCOHOL: NEVER
HOW MANY STANDARD DRINKS CONTAINING ALCOHOL DO YOU HAVE ON A TYPICAL DAY: PATIENT DOES NOT DRINK

## 2022-10-10 ASSESSMENT — PAIN DESCRIPTION - LOCATION: LOCATION: FINGER (COMMENT WHICH ONE)

## 2022-10-10 ASSESSMENT — PAIN DESCRIPTION - ORIENTATION: ORIENTATION: LEFT

## 2022-10-10 ASSESSMENT — PAIN - FUNCTIONAL ASSESSMENT: PAIN_FUNCTIONAL_ASSESSMENT: 0-10

## 2022-10-11 NOTE — ED PROVIDER NOTES
EMERGENCY DEPARTMENT ENCOUNTER      CHIEF COMPLAINT:   Left fifth finger injury    HPI: Yuridia Ngo is a 32 y.o. female who presents to the Emergency Department complaining of distal left fifth finger pain. The patient states that she was opening a drawer 1 week ago and that she jammed the tips of her fingers. The other fingers feel fine, but she has had persistent pain in the distal left pinky finger. She had her nails done today and when the  was manipulating her finger, it caused a lot of pain. She rates it as 3 out of 10 pain. It is achy and sore. It is worse with movement and better with rest. The patient denies fevers, chills, chest pain, shortness of breath, abdominal pain, numbness, tingling, weakness, or any other complaints. REVIEW OF SYSTEMS:  CONSTITUTIONAL:  Denies fever, chills, weight loss or weakness  EYES:  Denies photophobia or discharge  ENT:  Denies sore throat or ear pain  CARDIOVASCULAR:  Denies chest pain, palpitations or swelling  RESPIRATORY:  Denies cough or shortness of breath  GI: Denies abdominal pain, nausea, vomiting, or diarrhea  MUSCULOSKELETAL: See HPI  SKIN:  No rash  NEUROLOGIC:  Denies headache, focal weakness or sensory changes  All systems negative except as marked. \"Remaining review of systems reviewed and negative. I have reviewed the nursing triage documentation and agree unless otherwise noted below. \"    PAST MEDICAL HISTORY:   Past Medical History:   Diagnosis Date    Asthma        CURRENT MEDICATIONS:   Home medications reviewed. SURGICAL HISTORY:   Past Surgical History:   Procedure Laterality Date    BARIATRIC SURGERY         FAMILY HISTORY:   History reviewed. No pertinent family history.     SOCIAL HISTORY:   Social History     Socioeconomic History    Marital status: Single     Spouse name: Not on file    Number of children: Not on file    Years of education: Not on file    Highest education level: Not on file   Occupational History    Not on file   Tobacco Use    Smoking status: Never    Smokeless tobacco: Never   Vaping Use    Vaping Use: Never used   Substance and Sexual Activity    Alcohol use: Never    Drug use: Never    Sexual activity: Not on file   Other Topics Concern    Not on file   Social History Narrative    Not on file     Social Determinants of Health     Financial Resource Strain: Not on file   Food Insecurity: Not on file   Transportation Needs: Not on file   Physical Activity: Not on file   Stress: Not on file   Social Connections: Not on file   Intimate Partner Violence: Not on file   Housing Stability: Not on file       ALLERGIES: Amoxicillin    PHYSICAL EXAM:  VITAL SIGNS:   ED Triage Vitals [10/10/22 1942]   Enc Vitals Group      /76      Heart Rate 93      Resp 16      Temp 97.6 °F (36.4 °C)      Temp Source Infrared      SpO2 99 %      Weight 198 lb (89.8 kg)      Height 4' 11\" (1.499 m)      Head Circumference       Peak Flow       Pain Score       Pain Loc       Pain Edu? Excl. in [address]? Constitutional:  Non-toxic appearance  HENT: Normocephalic, Atraumatic  Eyes: PERRL, conjunctiva normal   Cardiovascular:  Normal heart rate, Normal rhythm  Pulmonary/Chest:  Normal breath sounds, No respiratory distress, No wheezing  Abdomen: Bowel sounds normal, Soft, No tenderness, No masses, No pulsatile masses  Extremities: There is tenderness to palpation of the distal left fifth finger with no bruising, swelling or deformity, the peripheral pulses are strong, Capillary refill is brisk, there is normal motor and sensory function, the hand is pink, warm, and well perfused, there is no cyanosis  Skin:  Warm, Dry, No erythema, No rash      EKG:    None    Radiology / Procedures:  XR HAND LEFT (MIN 3 VIEWS) (Final result)  Result time 10/10/22 20:44:29  Final result by Ernie Candelaria DO (10/10/22 20:44:29)                Impression:    No acute osseous abnormality.              Narrative:    EXAMINATION:   THREE XRAY VIEWS OF THE LEFT HAND     10/10/2022 8:03 pm     COMPARISON:   None. HISTORY:   ORDERING SYSTEM PROVIDED HISTORY: Left pinky finger injury   TECHNOLOGIST PROVIDED HISTORY:   Reason for exam:->Left pinky finger injury   Reason for Exam: Left pinky finger injury x 1 wk distal phalanx     FINDINGS:   Frontal, lateral and oblique views. No acute fracture. Bony alignment is   normal.  Joint spaces are preserved. No aggressive skeletal lesion. ED COURSE & MEDICAL DECISION MAKING:  Pertinent Labs & Imaging studies reviewed. (See chart for details)  On exam, the patient is afebrile and nontoxic appearing. She is hemodynamically stable and neurologically intact. Left hand x-rays are unremarkable. The patient was treated with Tylenol. She was placed in an Alumaform splint. I suspect that the patient has a sprain of her left little finger. I have a low suspicion for DVT, acute fracture, dislocation, compartment syndrome, necrotizing fasciitis, or neurovascular injury. I feel that the patient is stable for outpatient management with follow up in 2-3 days. Return precautions are given. The patient verbalized understanding, was agreeable with plan, and the patient was discharged home in stable condition. Clinical Impression:  1. Sprain of left little finger, unspecified site of digit, initial encounter        Disposition referral (if applicable):   Jossie Solorio MD  [address] [facility]   [address]  . [place] [address] [facility]  [phone]    Schedule an appointment as soon as possible for a visit       [address]  [phone]  Go to   If symptoms worsen    Disposition medications (if applicable):  Discharge Medication List as of 10/10/2022  9:06 PM            Comment: Please note this report has been produced using speech recognition software and may contain errors related to that system including errors in grammar, punctuation, and spelling, as well as words and phrases that may be inappropriate. If there are any questions or concerns please feel free to contact the dictating provider for clarification.         Batool Johnson MD  10/11/22 0633

## 2022-11-05 ENCOUNTER — HOSPITAL ENCOUNTER (EMERGENCY)
Age: 27
Discharge: HOME | End: 2022-11-05
Attending: EMERGENCY MEDICINE
Payer: COMMERCIAL

## 2022-11-05 VITALS
TEMPERATURE: 97 F | BODY MASS INDEX: 40.4 KG/M2 | SYSTOLIC BLOOD PRESSURE: 133 MMHG | WEIGHT: 200 LBS | OXYGEN SATURATION: 99 % | HEART RATE: 66 BPM | DIASTOLIC BLOOD PRESSURE: 74 MMHG | RESPIRATION RATE: 18 BRPM

## 2022-11-05 DIAGNOSIS — S69.91XA INJURY OF FINGER OF RIGHT HAND, INITIAL ENCOUNTER: Primary | ICD-10-CM

## 2022-11-05 PROCEDURE — 99283 EMERGENCY DEPT VISIT LOW MDM: CPT

## 2022-11-05 RX ORDER — CEPHALEXIN 500 MG/1
500 CAPSULE ORAL 2 TIMES DAILY
Qty: 10 CAPSULE | Refills: 0 | Status: SHIPPED | OUTPATIENT
Start: 2022-11-05 | End: 2022-11-10

## 2022-11-05 ASSESSMENT — ENCOUNTER SYMPTOMS
EYE DISCHARGE: 0
EYE PAIN: 0
COUGH: 0
NAUSEA: 0
RHINORRHEA: 0
BACK PAIN: 0
SORE THROAT: 0
VOMITING: 0
SHORTNESS OF BREATH: 0
ABDOMINAL PAIN: 0

## 2022-11-05 ASSESSMENT — PAIN - FUNCTIONAL ASSESSMENT: PAIN_FUNCTIONAL_ASSESSMENT: NONE - DENIES PAIN

## 2022-11-06 NOTE — ED PROVIDER NOTES
[address] [facility] ENCOUNTER      Pt Name: Linda Dawkins  MRN: [id number]  [place] 1995  Date of evaluation: 11/5/2022  Provider: Sonido Mcbride MD    CHIEF COMPLAINT       Chief Complaint   Patient presents with    Finger Injury     Nail off right 5th finger           HISTORY OF PRESENT ILLNESS      Linda Dawkins is a 32 y.o. female who presents to the emergency department  for   Chief Complaint   Patient presents with    Finger Injury     Nail off right 5th finger         33-year-old female presents with concern for partial avulsion of her nail on her right small finger. She reports that she did catch the nail on the right small finger a couple weeks ago on something and it seemed to lift off from the nailbed. She reports that earlier today she caught it again on something and a corner of the nail she believes is torn off. She has not had any bleeding. Denies any numbness or tingling in the hand. She is right-hand dominant. She does have acrylic nails on. She presents with a GCS of 15. She is moving her right hand well        Nursing Notes, Triage Notes & Vital Signs were reviewed. REVIEW OF SYSTEMS    (2-9 systems for level 4, 10 or more for level 5)     Review of Systems   Constitutional:  Negative for chills and fever. HENT:  Negative for congestion, rhinorrhea and sore throat. Eyes:  Negative for pain and discharge. Respiratory:  Negative for cough and shortness of breath. Cardiovascular:  Negative for chest pain. Gastrointestinal:  Negative for abdominal pain, nausea and vomiting. Endocrine: Negative for polydipsia and polyuria. Genitourinary:  Negative for dysuria and flank pain. Musculoskeletal:  Negative for back pain and neck pain. Skin:  Negative for pallor and wound. Neurological:  Negative for dizziness, facial asymmetry, light-headedness, numbness and headaches.    Psychiatric/Behavioral:  Negative for confusion. Except as noted above the remainder of the review of systems was reviewed and negative. PAST MEDICAL HISTORY     Past Medical History:   Diagnosis Date    Asthma        Prior to Admission medications    Medication Sig Start Date End Date Taking? Authorizing Provider   cephALEXin (KEFLEX) 500 MG capsule Take 1 capsule by mouth 2 times daily for 5 days 11/5/22 11/10/22 Yes Rikki Hahn MD   guaiFENesin (ROBITUSSIN) 100 MG/5ML SOLN oral solution Take 10 mLs by mouth every 6 hours as needed for Cough 11/21/21   Morgan Blancas MD   albuterol sulfate HFA (VENTOLIN HFA) 108 (90 Base) MCG/ACT inhaler Inhale 2 puffs into the lungs 4 times daily as needed for Wheezing 11/21/21   Morgan Blancas MD   albuterol (PROVENTIL) (2.5 MG/3ML) 0.083% nebulizer solution Take 3 mLs by nebulization every 6 hours as needed for Wheezing or Shortness of Breath 11/21/21   Morgan Blancas MD        There is no problem list on file for this patient. SURGICAL HISTORY       Past Surgical History:   Procedure Laterality Date    BARIATRIC SURGERY           CURRENT MEDICATIONS       Discharge Medication List as of 11/5/2022 10:08 PM        CONTINUE these medications which have NOT CHANGED    Details   guaiFENesin (ROBITUSSIN) 100 MG/5ML SOLN oral solution Take 10 mLs by mouth every 6 hours as needed for Cough, Disp-1200 mL, R-0Print      albuterol sulfate HFA (VENTOLIN HFA) 108 (90 Base) MCG/ACT inhaler Inhale 2 puffs into the lungs 4 times daily as needed for Wheezing, Disp-18 g, R-0Print      albuterol (PROVENTIL) (2.5 MG/3ML) 0.083% nebulizer solution Take 3 mLs by nebulization every 6 hours as needed for Wheezing or Shortness of Breath, Disp-120 each, R-1Print             ALLERGIES     Amoxicillin    FAMILY HISTORY     History reviewed. No pertinent family history.        SOCIAL HISTORY       Social History     Socioeconomic History    Marital status: Single     Spouse name: None    Number of children: None    Years of education: None    Highest education level: None   Tobacco Use    Smoking status: Never    Smokeless tobacco: Never   Vaping Use    Vaping Use: Never used   Substance and Sexual Activity    Alcohol use: Never    Drug use: Never       SCREENINGS    Kristie Coma Scale  Eye Opening: Spontaneous  Best Verbal Response: Oriented  Best Motor Response: Obeys commands  Kristie Coma Scale Score: 15          PHYSICAL EXAM    (up to 7 for level 4, 8 or more for level 5)     ED Triage Vitals [11/05/22 2025]   BP Temp Temp src Heart Rate Resp SpO2 Height Weight   133/74 97 °F (36.1 °C) -- 66 18 99 % -- 200 lb (90.7 kg)       Physical Exam  Vitals reviewed. Constitutional:       Appearance: She is not ill-appearing or toxic-appearing. HENT:      Head: Normocephalic and atraumatic. Nose: Nose normal. No congestion or rhinorrhea. Mouth/Throat:      Pharynx: No oropharyngeal exudate or posterior oropharyngeal erythema. Eyes:      Extraocular Movements: Extraocular movements intact. Pupils: Pupils are equal, round, and reactive to light. Cardiovascular:      Rate and Rhythm: Normal rate. Heart sounds: No friction rub. No gallop. Pulmonary:      Effort: Pulmonary effort is normal.      Breath sounds: Normal breath sounds. No stridor. No wheezing. Abdominal:      Palpations: Abdomen is soft. Tenderness: There is no abdominal tenderness. There is no guarding. Musculoskeletal:         General: No swelling or tenderness. Normal range of motion. Cervical back: Normal range of motion and neck supple. Skin:     General: Skin is warm. Capillary Refill: Capillary refill takes less than 2 seconds. Comments: No nailbed laceration at right small finger; nail still intact at base; has acrylic nails adhered to natural nails   Neurological:      General: No focal deficit present. Mental Status: She is alert and oriented to person, place, and time.        DIAGNOSTIC RESULTS     Labs Reviewed - No data to display       RADIOLOGY:     Non-plain film images such as CT, Ultrasound and MRI are read by the radiologist. Plain radiographic images are visualized and preliminarily interpreted by the emergency physician. Interpretation per the Radiologist below, if available at the time of this note:    No orders to display         ED BEDSIDE ULTRASOUND:   Performed by ED Physician Christ Mcclendon MD       LABS:  Labs Reviewed - No data to display    All other labs were within normal range or not returned as of this dictation. EMERGENCY DEPARTMENT COURSE and DIFFERENTIAL DIAGNOSIS/MDM:   Vitals:    Vitals:    11/05/22 2025   BP: 133/74   Pulse: 66   Resp: 18   Temp: 97 °F (36.1 °C)   SpO2: 99%   Weight: 200 lb (90.7 kg)           MDM  Number of Diagnoses or Management Options  Injury of finger of right hand, initial encounter  Diagnosis management comments: 32 yof presents with concern for partial nail avulsion in the right small finger. She does have acrylic nails on top of natural nails. She endorses that several weeks ago her nail was caught in the nail seem to lift partially off the nailbed. She reports that earlier today the nail got caught again and she now feels that it is further lifted off the nail bed. She is not having any bleeding. She presents moving her right hand well. No numbness or tingling in the right finger. There does not appear to be any nailbed laceration. There is no bleeding around the nail. The nail still is attached at the base. We did attempt to cut down the nail however given the acrylic nail on top cannot safely trim the nail. I did glue the nail to the nail bed using tissue adhesive. We did place a dressing on top of this. She will follow-up with her manicurist to get the acrylic nail removed. We did discuss that as new nail moves and that her older nail we will likely fall off. She will watch the symptoms at home.   She will follow-up with her primary care physician. She is discharged in stable condition with return precautions. -  Patient seen and evaluated in the emergency department. -  Triage and nursing notes reviewed and incorporated. -  Old chart records reviewed and incorporated. -  Work-up included:  See above  -  Results discussed with patient. CONSULTS:  None    PROCEDURES:  None performed unless otherwise noted below     Procedures        FINAL IMPRESSION      1. Injury of finger of right hand, initial encounter          DISPOSITION/PLAN   DISPOSITION Decision To Discharge 11/05/2022 10:06:20 PM      PATIENT REFERRED TO:  Flora Flannery MD  [address] [facility]   [address]  . [address] [facility]  [phone]    Schedule an appointment as soon as possible for a visit in 1 week  As needed    DISCHARGE MEDICATIONS:  Discharge Medication List as of 11/5/2022 10:08 PM        START taking these medications    Details   cephALEXin (KEFLEX) 500 MG capsule Take 1 capsule by mouth 2 times daily for 5 days, Disp-10 capsule, R-0Normal             ED Provider Disposition Time  DISPOSITION Decision To Discharge 11/05/2022 10:06:20 PM      Appropriate personal protective equipment was worn during the patient's evaluation. These included surgical, eye protection, surgical mask or in 95 respirator and gloves. The patient was also placed in a surgical mask for the prevention of possible spread of respiratory viral illnesses. The Patient was instructed to read the package inserts with any medication that was prescribed. Major potential reactions and medication interactions were discussed. The Patient understands that there are numerous possible adverse reactions not covered. The patient was also instructed to arrange follow-up with his or her primary care provider for review of any pending labwork or incidental findings on any radiology results that were obtained.   All efforts were made to discuss any incidental findings that require further monitoring. Controlled Substances Monitoring:     No flowsheet data found.     (Please note that portions of this note were completed with a voice recognition program.  Efforts were made to edit the dictations but occasionally words are mis-transcribed.)    Pau Brown MD (electronically signed)  Attending Emergency Physician            Pau Brown MD  11/05/22 5523

## 2022-11-06 NOTE — DISCHARGE INSTRUCTIONS
Please trim your right small fingernail to avoid further injury. Please follow-up with your primary care physician for further evaluation and management. If you develop any worsening or concerning symptoms, please seek immediate medical attention.

## 2023-06-15 PROBLEM — F41.9 ANXIETY: Status: ACTIVE | Noted: 2021-10-28

## 2023-06-15 PROBLEM — D50.9 IRON DEFICIENCY ANEMIA, UNSPECIFIED: Status: ACTIVE | Noted: 2022-07-28

## 2023-06-15 PROBLEM — F90.0 ATTENTION DEFICIT HYPERACTIVITY DISORDER (ADHD), PREDOMINANTLY INATTENTIVE TYPE: Status: ACTIVE | Noted: 2021-10-28

## 2023-06-15 PROBLEM — K44.9 HIATAL HERNIA: Status: ACTIVE | Noted: 2022-07-05

## 2023-06-15 PROBLEM — E66.01 MORBID OBESITY (HCC): Status: ACTIVE | Noted: 2022-07-06

## 2023-06-15 PROBLEM — A60.00 GENITAL HSV: Status: ACTIVE | Noted: 2018-03-19

## 2023-06-15 PROBLEM — K90.9 INTESTINAL MALABSORPTION: Status: ACTIVE | Noted: 2022-07-28

## 2023-06-15 PROBLEM — J45.20 ASTHMA, INTERMITTENT: Status: ACTIVE | Noted: 2017-08-21

## 2023-06-15 PROBLEM — Z90.3 HISTORY OF SLEEVE GASTRECTOMY: Status: ACTIVE | Noted: 2022-07-13

## 2023-08-20 ENCOUNTER — HOSPITAL ENCOUNTER (EMERGENCY)
Age: 28
Discharge: HOME | End: 2023-08-20
Attending: STUDENT IN AN ORGANIZED HEALTH CARE EDUCATION/TRAINING PROGRAM
Payer: COMMERCIAL

## 2023-08-20 VITALS
HEIGHT: 59 IN | WEIGHT: 170 LBS | TEMPERATURE: 98.5 F | DIASTOLIC BLOOD PRESSURE: 73 MMHG | HEART RATE: 80 BPM | RESPIRATION RATE: 18 BRPM | SYSTOLIC BLOOD PRESSURE: 116 MMHG | OXYGEN SATURATION: 98 % | BODY MASS INDEX: 34.27 KG/M2

## 2023-08-20 DIAGNOSIS — K02.9 DENTAL DECAY: Primary | ICD-10-CM

## 2023-08-20 PROCEDURE — 6370000000 HC RX 637 (ALT 250 FOR IP): Performed by: STUDENT IN AN ORGANIZED HEALTH CARE EDUCATION/TRAINING PROGRAM

## 2023-08-20 PROCEDURE — 99283 EMERGENCY DEPT VISIT LOW MDM: CPT

## 2023-08-20 RX ORDER — CLINDAMYCIN HYDROCHLORIDE 300 MG/1
300 CAPSULE ORAL 3 TIMES DAILY
Qty: 15 CAPSULE | Refills: 0 | Status: SHIPPED | OUTPATIENT
Start: 2023-08-20 | End: 2023-08-25

## 2023-08-20 RX ORDER — CLINDAMYCIN HYDROCHLORIDE 150 MG/1
300 CAPSULE ORAL ONCE
Status: COMPLETED | OUTPATIENT
Start: 2023-08-20 | End: 2023-08-20

## 2023-08-20 RX ADMIN — CLINDAMYCIN HYDROCHLORIDE 300 MG: 150 CAPSULE ORAL at 15:19

## 2023-08-20 ASSESSMENT — PAIN DESCRIPTION - DESCRIPTORS: DESCRIPTORS: THROBBING;STABBING

## 2023-08-20 ASSESSMENT — PAIN DESCRIPTION - LOCATION: LOCATION: TEETH

## 2023-08-20 ASSESSMENT — PAIN SCALES - GENERAL: PAINLEVEL_OUTOF10: 3

## 2023-08-20 ASSESSMENT — PAIN - FUNCTIONAL ASSESSMENT: PAIN_FUNCTIONAL_ASSESSMENT: 0-10

## 2023-08-20 NOTE — ED PROVIDER NOTES
critical scenario, discussion with consulting services. This excludes seperately billable procedures and teaching. FINAL IMPRESSION      1. Dental decay          DISPOSITION / PLAN     DISPOSITION Decision To Discharge 08/20/2023 03:13:34 PM      PATIENT REFERRED TO:  No follow-up provider specified.     DISCHARGE MEDICATIONS:  Discharge Medication List as of 8/20/2023  3:21 PM        START taking these medications    Details   clindamycin (CLEOCIN) 300 MG capsule Take 1 capsule by mouth 3 times daily for 5 days, Disp-15 capsule, R-0Normal      benzocaine (ORAJEL) 10 % mucosal gel Take by mouth 3 times daily as needed for Pain Take by mouth as needed., Mouth/Throat, 3 TIMES DAILY PRN Starting Sun 8/20/2023, Disp-7 g, R-0, Normal             Dave Iverson, DO  Emergency Medicine    (Please note that portions of this note were completed with a voice recognition program.  Efforts were made to edit the dictations but occasionally words are mis-transcribed.)        Dave Iverson,   Resident  08/22/23 9200

## 2023-08-20 NOTE — ED NOTES
Pt was given discharge instructions and knowledge to  prescription at pharmacy. Pt ambulated to exit with out incident.      Ky Elizalde RN  08/20/23 3088
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
## 2023-08-20 NOTE — DISCHARGE INSTRUCTIONS
You were seen here today for dental pain. Is due to dental decay which in part is due to the infection which is now decaying towards your nerve thus causing the increase of sharp pain. For pain I recommend take with as milligrams of Tylenol every 8 hours. You may also use Orajel as prescribed as needed for additional numbing and pain relief. Avoid smoking, avoid snack foods. Please Aggrenox as prescribed until they are finished. You will need to call and schedule follow-up appointment with dentistry either at [facility] or [place] which both have free dental clinics for the uninsured.

## 2023-08-22 ASSESSMENT — ENCOUNTER SYMPTOMS
SORE THROAT: 0
VOICE CHANGE: 0
FACIAL SWELLING: 0
TROUBLE SWALLOWING: 0

## 2023-09-04 ENCOUNTER — HOSPITAL ENCOUNTER (EMERGENCY)
Age: 28
Discharge: HOME | End: 2023-09-04
Attending: EMERGENCY MEDICINE
Payer: COMMERCIAL

## 2023-09-04 VITALS
HEART RATE: 80 BPM | BODY MASS INDEX: 34.27 KG/M2 | SYSTOLIC BLOOD PRESSURE: 135 MMHG | WEIGHT: 170 LBS | HEIGHT: 59 IN | RESPIRATION RATE: 16 BRPM | OXYGEN SATURATION: 100 % | DIASTOLIC BLOOD PRESSURE: 98 MMHG | TEMPERATURE: 98.5 F

## 2023-09-04 DIAGNOSIS — K08.89 PAIN, DENTAL: Primary | ICD-10-CM

## 2023-09-04 PROCEDURE — 99283 EMERGENCY DEPT VISIT LOW MDM: CPT

## 2023-09-04 PROCEDURE — 6370000000 HC RX 637 (ALT 250 FOR IP): Performed by: EMERGENCY MEDICINE

## 2023-09-04 RX ORDER — OXYCODONE HYDROCHLORIDE AND ACETAMINOPHEN 5; 325 MG/1; MG/1
1 TABLET ORAL EVERY 6 HOURS PRN
Qty: 12 TABLET | Refills: 0 | Status: SHIPPED | OUTPATIENT
Start: 2023-09-04 | End: 2023-09-07

## 2023-09-04 RX ORDER — CLINDAMYCIN HYDROCHLORIDE 300 MG/1
300 CAPSULE ORAL 3 TIMES DAILY
Qty: 30 CAPSULE | Refills: 0 | Status: SHIPPED | OUTPATIENT
Start: 2023-09-04 | End: 2023-09-14

## 2023-09-04 RX ORDER — CLINDAMYCIN HYDROCHLORIDE 150 MG/1
300 CAPSULE ORAL EVERY 8 HOURS SCHEDULED
Status: DISCONTINUED | OUTPATIENT
Start: 2023-09-04 | End: 2023-09-04 | Stop reason: HOSPADM

## 2023-09-04 RX ORDER — OXYCODONE HYDROCHLORIDE AND ACETAMINOPHEN 5; 325 MG/1; MG/1
1 TABLET ORAL ONCE
Status: COMPLETED | OUTPATIENT
Start: 2023-09-04 | End: 2023-09-04

## 2023-09-04 RX ADMIN — OXYCODONE HYDROCHLORIDE AND ACETAMINOPHEN 1 TABLET: 5; 325 TABLET ORAL at 15:00

## 2023-09-04 RX ADMIN — CLINDAMYCIN HYDROCHLORIDE 300 MG: 150 CAPSULE ORAL at 14:59

## 2023-09-04 ASSESSMENT — PAIN DESCRIPTION - DESCRIPTORS: DESCRIPTORS: DISCOMFORT

## 2023-09-04 ASSESSMENT — PAIN SCALES - GENERAL: PAINLEVEL_OUTOF10: 3

## 2023-09-04 ASSESSMENT — PAIN DESCRIPTION - LOCATION: LOCATION: TEETH

## 2023-09-04 ASSESSMENT — PAIN - FUNCTIONAL ASSESSMENT
PAIN_FUNCTIONAL_ASSESSMENT: NONE - DENIES PAIN
PAIN_FUNCTIONAL_ASSESSMENT: ACTIVITIES ARE NOT PREVENTED

## 2023-09-04 NOTE — ED NOTES
Discharge instructions given with prescription verbalized understanding pt is ambulatory out       Chante Galvan RN  09/04/23 7227

## 2023-09-15 ENCOUNTER — OFFICE VISIT (OUTPATIENT)
Dept: FAMILY MEDICINE CLINIC | Age: 28
End: 2023-09-15

## 2023-09-15 VITALS
BODY MASS INDEX: 35.57 KG/M2 | OXYGEN SATURATION: 100 % | WEIGHT: 181.2 LBS | HEIGHT: 60 IN | HEART RATE: 85 BPM | TEMPERATURE: 98.2 F | DIASTOLIC BLOOD PRESSURE: 80 MMHG | SYSTOLIC BLOOD PRESSURE: 124 MMHG

## 2023-09-15 DIAGNOSIS — E66.09 CLASS 2 OBESITY DUE TO EXCESS CALORIES WITHOUT SERIOUS COMORBIDITY WITH BODY MASS INDEX (BMI) OF 35.0 TO 35.9 IN ADULT: ICD-10-CM

## 2023-09-15 DIAGNOSIS — Z11.59 SCREENING FOR VIRAL DISEASE: ICD-10-CM

## 2023-09-15 DIAGNOSIS — D50.8 IRON DEFICIENCY ANEMIA SECONDARY TO INADEQUATE DIETARY IRON INTAKE: ICD-10-CM

## 2023-09-15 DIAGNOSIS — G89.29 CHRONIC HEAD PAIN: ICD-10-CM

## 2023-09-15 DIAGNOSIS — F90.0 ATTENTION DEFICIT HYPERACTIVITY DISORDER (ADHD), PREDOMINANTLY INATTENTIVE TYPE: ICD-10-CM

## 2023-09-15 DIAGNOSIS — Z00.00 ENCOUNTER FOR WELL ADULT EXAM WITHOUT ABNORMAL FINDINGS: Primary | ICD-10-CM

## 2023-09-15 DIAGNOSIS — F41.9 ANXIETY: ICD-10-CM

## 2023-09-15 DIAGNOSIS — J45.30 MILD PERSISTENT ASTHMA WITHOUT COMPLICATION: ICD-10-CM

## 2023-09-15 DIAGNOSIS — Z90.3 HISTORY OF SLEEVE GASTRECTOMY: ICD-10-CM

## 2023-09-15 DIAGNOSIS — R51.9 CHRONIC HEAD PAIN: ICD-10-CM

## 2023-09-15 PROBLEM — E66.812 CLASS 2 OBESITY DUE TO EXCESS CALORIES WITHOUT SERIOUS COMORBIDITY WITH BODY MASS INDEX (BMI) OF 35.0 TO 35.9 IN ADULT: Status: ACTIVE | Noted: 2022-07-06

## 2023-09-15 RX ORDER — DEXTROAMPHETAMINE SACCHARATE, AMPHETAMINE ASPARTATE, DEXTROAMPHETAMINE SULFATE AND AMPHETAMINE SULFATE 2.5; 2.5; 2.5; 2.5 MG/1; MG/1; MG/1; MG/1
10 TABLET ORAL 2 TIMES DAILY
Qty: 60 TABLET | Refills: 0 | Status: SHIPPED | OUTPATIENT
Start: 2023-09-15 | End: 2023-10-15

## 2023-09-15 RX ORDER — ALBUTEROL SULFATE 2.5 MG/3ML
2.5 SOLUTION RESPIRATORY (INHALATION) EVERY 6 HOURS PRN
Qty: 120 EACH | Refills: 1 | Status: SHIPPED | OUTPATIENT
Start: 2023-09-15

## 2023-09-15 RX ORDER — ALBUTEROL SULFATE 90 UG/1
2 AEROSOL, METERED RESPIRATORY (INHALATION) 4 TIMES DAILY PRN
Qty: 18 G | Refills: 0 | Status: SHIPPED | OUTPATIENT
Start: 2023-09-15

## 2023-09-15 RX ORDER — HYDROXYZINE HYDROCHLORIDE 25 MG/1
25 TABLET, FILM COATED ORAL EVERY 8 HOURS PRN
Qty: 60 TABLET | Refills: 0 | Status: SHIPPED | OUTPATIENT
Start: 2023-09-15

## 2023-09-15 ASSESSMENT — ENCOUNTER SYMPTOMS
DIARRHEA: 0
COUGH: 0
SORE THROAT: 0
NAUSEA: 0
EYE REDNESS: 0
BACK PAIN: 0
SHORTNESS OF BREATH: 0
EYE PAIN: 0
PHOTOPHOBIA: 0
SINUS PRESSURE: 0
VOMITING: 0

## 2023-09-15 ASSESSMENT — PATIENT HEALTH QUESTIONNAIRE - PHQ9
2. FEELING DOWN, DEPRESSED OR HOPELESS: 0
1. LITTLE INTEREST OR PLEASURE IN DOING THINGS: 0
SUM OF ALL RESPONSES TO PHQ QUESTIONS 1-9: 0
SUM OF ALL RESPONSES TO PHQ9 QUESTIONS 1 & 2: 0
SUM OF ALL RESPONSES TO PHQ QUESTIONS 1-9: 0

## 2023-09-15 NOTE — PROGRESS NOTES
Lymphadenopathy:      Cervical: No cervical adenopathy. Right upper body: No supraclavicular or axillary adenopathy. Left upper body: No supraclavicular or axillary adenopathy. Lower Body: No right inguinal adenopathy. No left inguinal adenopathy. Skin:     General: Skin is warm. Coloration: Skin is not jaundiced. Neurological:      Mental Status: She is alert. Deep Tendon Reflexes:      Reflex Scores:       Patellar reflexes are 2+ on the right side and 2+ on the left side. Psychiatric:         Attention and Perception: Attention and perception normal.         Mood and Affect: Mood normal.         Speech: Speech normal.         Behavior: Behavior normal.         Thought Content: Thought content normal.    Reviewed: PDMP shows Vyvanse 3- in 2/2023 and Adderall 10bid or 20 XR daily in 2022. ASSESSMENT/PLAN:    1. Encounter for well adult exam without abnormal findings  2. History of sleeve gastrectomy  Assessment & Plan:   Stable , will check labs to monitor for possible vitamin/mineral deficiency. Orders:  -     Comprehensive Metabolic Panel  -     Magnesium  -     Vitamin D 25 Hydroxy  -     Vitamin B12 & Folate  -     Lipid Panel  3. Iron deficiency anemia secondary to inadequate dietary iron intake  Assessment & Plan: We will monitor with CBC  Orders:  -     CBC with Auto Differential  4. Asthma, Mild persistent asthma without complication  Assessment & Plan:   Asthma is well controlled. Plan to continue albuterol. Orders:  -     albuterol sulfate HFA (VENTOLIN HFA) 108 (90 Base) MCG/ACT inhaler; Inhale 2 puffs into the lungs 4 times daily as needed for Wheezing, Disp-18 g, R-0Normal  -     DME Order for Nebulizer as OP  -     albuterol (PROVENTIL) (2.5 MG/3ML) 0.083% nebulizer solution; Take 3 mLs by nebulization every 6 hours as needed for Wheezing or Shortness of Breath, Disp-120 each, R-1Normal  5.  Class 2 obesity due to excess calories without serious comorbidity

## 2023-09-15 NOTE — ASSESSMENT & PLAN NOTE
Her head pains are less severe and currently managed with nonprescription medications which we will continue for the time being.

## 2023-09-15 NOTE — ASSESSMENT & PLAN NOTE
I will prescribe some Atarax to help with her anxiety on a as needed basis. I would rather not start a second long-term anxiety medicine at the same time that I am starting the Adderall but we may very well start medication such as an SSRI or buspirone next visit.

## 2023-09-15 NOTE — ASSESSMENT & PLAN NOTE
Her primary concern is attention deficit disorder and she has done well with Adderall in the past.  I will restart Adderall 10 mg twice a day. Plan to follow-up in about a month.

## 2023-09-16 LAB
25(OH)D3 SERPL-MCNC: 21.2 NG/ML
ALBUMIN SERPL-MCNC: 4.7 G/DL (ref 3.4–5)
ALBUMIN/GLOB SERPL: 2 {RATIO} (ref 1.1–2.2)
ALP SERPL-CCNC: 70 U/L (ref 40–129)
ALT SERPL-CCNC: 11 U/L (ref 10–40)
ANION GAP SERPL CALCULATED.3IONS-SCNC: 10 MMOL/L (ref 3–16)
AST SERPL-CCNC: 14 U/L (ref 15–37)
BASOPHILS # BLD: 0 K/UL (ref 0–0.2)
BASOPHILS NFR BLD: 0.6 %
BILIRUB SERPL-MCNC: 0.3 MG/DL (ref 0–1)
BUN SERPL-MCNC: 19 MG/DL (ref 7–20)
CALCIUM SERPL-MCNC: 9.9 MG/DL (ref 8.3–10.6)
CHLORIDE SERPL-SCNC: 100 MMOL/L (ref 99–110)
CHOLEST SERPL-MCNC: 138 MG/DL (ref 0–199)
CO2 SERPL-SCNC: 27 MMOL/L (ref 21–32)
CREAT SERPL-MCNC: 0.6 MG/DL (ref 0.6–1.1)
DEPRECATED RDW RBC AUTO: 15.4 % (ref 12.4–15.4)
EOSINOPHIL # BLD: 0.5 K/UL (ref 0–0.6)
EOSINOPHIL NFR BLD: 7 %
FOLATE SERPL-MCNC: 2.19 NG/ML (ref 4.78–24.2)
GFR SERPLBLD CREATININE-BSD FMLA CKD-EPI: >60 ML/MIN/{1.73_M2}
GLUCOSE SERPL-MCNC: 77 MG/DL (ref 70–99)
HCT VFR BLD AUTO: 37.6 % (ref 36–48)
HCV AB SERPL QL IA: NORMAL
HDLC SERPL-MCNC: 57 MG/DL (ref 40–60)
HGB BLD-MCNC: 12.4 G/DL (ref 12–16)
LDLC SERPL CALC-MCNC: 63 MG/DL
LYMPHOCYTES # BLD: 2.1 K/UL (ref 1–5.1)
LYMPHOCYTES NFR BLD: 27.9 %
MAGNESIUM SERPL-MCNC: 2.1 MG/DL (ref 1.8–2.4)
MCH RBC QN AUTO: 26.5 PG (ref 26–34)
MCHC RBC AUTO-ENTMCNC: 32.9 G/DL (ref 31–36)
MCV RBC AUTO: 80.5 FL (ref 80–100)
MONOCYTES # BLD: 0.4 K/UL (ref 0–1.3)
MONOCYTES NFR BLD: 5.5 %
NEUTROPHILS # BLD: 4.4 K/UL (ref 1.7–7.7)
NEUTROPHILS NFR BLD: 59 %
PLATELET # BLD AUTO: 253 K/UL (ref 135–450)
PMV BLD AUTO: 10.1 FL (ref 5–10.5)
POTASSIUM SERPL-SCNC: 4.6 MMOL/L (ref 3.5–5.1)
PROT SERPL-MCNC: 7 G/DL (ref 6.4–8.2)
RBC # BLD AUTO: 4.67 M/UL (ref 4–5.2)
SODIUM SERPL-SCNC: 137 MMOL/L (ref 136–145)
TRIGL SERPL-MCNC: 91 MG/DL (ref 0–150)
VIT B12 SERPL-MCNC: 558 PG/ML (ref 211–911)
VLDLC SERPL CALC-MCNC: 18 MG/DL
WBC # BLD AUTO: 7.4 K/UL (ref 4–11)

## 2023-10-03 ENCOUNTER — TELEPHONE (OUTPATIENT)
Dept: FAMILY MEDICINE CLINIC | Age: 28
End: 2023-10-03

## 2023-10-03 DIAGNOSIS — E53.8 FOLATE DEFICIENCY: Primary | ICD-10-CM

## 2023-10-03 DIAGNOSIS — E55.9 VITAMIN D DEFICIENCY: ICD-10-CM

## 2023-10-03 RX ORDER — FOLIC ACID 1 MG/1
1 TABLET ORAL DAILY
Qty: 90 TABLET | Refills: 3 | Status: SHIPPED | OUTPATIENT
Start: 2023-10-03

## 2023-10-03 RX ORDER — CHOLECALCIFEROL (VITAMIN D3) 125 MCG
1 CAPSULE ORAL DAILY
Qty: 90 TABLET | Refills: 3 | Status: SHIPPED | OUTPATIENT
Start: 2023-10-03

## 2023-10-03 NOTE — TELEPHONE ENCOUNTER
Patient's labs showed the Vitamin D and folate levels being low. She would like for you to send a prescription for the supplement.

## 2023-10-13 ENCOUNTER — OFFICE VISIT (OUTPATIENT)
Dept: FAMILY MEDICINE CLINIC | Age: 28
End: 2023-10-13
Payer: COMMERCIAL

## 2023-10-13 VITALS
SYSTOLIC BLOOD PRESSURE: 120 MMHG | TEMPERATURE: 97 F | HEIGHT: 60 IN | HEART RATE: 91 BPM | DIASTOLIC BLOOD PRESSURE: 70 MMHG | WEIGHT: 180.6 LBS | BODY MASS INDEX: 35.46 KG/M2 | OXYGEN SATURATION: 99 %

## 2023-10-13 DIAGNOSIS — J45.30 MILD PERSISTENT ASTHMA WITHOUT COMPLICATION: ICD-10-CM

## 2023-10-13 DIAGNOSIS — F90.0 ATTENTION DEFICIT HYPERACTIVITY DISORDER (ADHD), PREDOMINANTLY INATTENTIVE TYPE: Primary | Chronic | ICD-10-CM

## 2023-10-13 PROCEDURE — 1036F TOBACCO NON-USER: CPT | Performed by: FAMILY MEDICINE

## 2023-10-13 PROCEDURE — G8427 DOCREV CUR MEDS BY ELIG CLIN: HCPCS | Performed by: FAMILY MEDICINE

## 2023-10-13 PROCEDURE — G8484 FLU IMMUNIZE NO ADMIN: HCPCS | Performed by: FAMILY MEDICINE

## 2023-10-13 PROCEDURE — G8417 CALC BMI ABV UP PARAM F/U: HCPCS | Performed by: FAMILY MEDICINE

## 2023-10-13 PROCEDURE — 99213 OFFICE O/P EST LOW 20 MIN: CPT | Performed by: FAMILY MEDICINE

## 2023-10-13 RX ORDER — LISDEXAMFETAMINE DIMESYLATE CAPSULES 30 MG/1
30 CAPSULE ORAL DAILY
Qty: 30 CAPSULE | Refills: 0 | Status: SHIPPED | OUTPATIENT
Start: 2023-10-13 | End: 2023-11-12

## 2023-10-13 RX ORDER — ALBUTEROL SULFATE 90 UG/1
2 AEROSOL, METERED RESPIRATORY (INHALATION) 4 TIMES DAILY PRN
Qty: 18 G | Refills: 5 | Status: SHIPPED | OUTPATIENT
Start: 2023-10-13

## 2023-10-13 NOTE — ASSESSMENT & PLAN NOTE
Unfortunately Adderall 10 mg twice a day was not helpful for her ADHD symptoms. Vyvanse 30 mg a day was helpful and she thinks that the palpitations she had with it may have been due to to the energy drinks she was drinking rather than the Vyvanse. 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 In about a month she can let me know and I told her that if she is not having any of the palpitations that I could add 2 more months of the Vyvanse without an appointment.

## 2023-11-03 ENCOUNTER — TELEMEDICINE (OUTPATIENT)
Dept: FAMILY MEDICINE CLINIC | Age: 28
End: 2023-11-03
Payer: COMMERCIAL

## 2023-11-03 DIAGNOSIS — Z69.81 PATIENT COUNSELED AS VICTIM OF DOMESTIC VIOLENCE: ICD-10-CM

## 2023-11-03 DIAGNOSIS — F41.9 ANXIETY: Primary | ICD-10-CM

## 2023-11-03 PROCEDURE — G8417 CALC BMI ABV UP PARAM F/U: HCPCS | Performed by: FAMILY MEDICINE

## 2023-11-03 PROCEDURE — 1036F TOBACCO NON-USER: CPT | Performed by: FAMILY MEDICINE

## 2023-11-03 PROCEDURE — 99214 OFFICE O/P EST MOD 30 MIN: CPT | Performed by: FAMILY MEDICINE

## 2023-11-03 PROCEDURE — G8484 FLU IMMUNIZE NO ADMIN: HCPCS | Performed by: FAMILY MEDICINE

## 2023-11-03 PROCEDURE — G8428 CUR MEDS NOT DOCUMENT: HCPCS | Performed by: FAMILY MEDICINE

## 2023-11-03 RX ORDER — BUSPIRONE HYDROCHLORIDE 10 MG/1
10 TABLET ORAL 3 TIMES DAILY
Qty: 90 TABLET | Refills: 0 | Status: SHIPPED | OUTPATIENT
Start: 2023-11-03 | End: 2023-12-03

## 2023-11-03 RX ORDER — HYDROXYZINE PAMOATE 25 MG/1
25 CAPSULE ORAL 4 TIMES DAILY PRN
Qty: 60 CAPSULE | Refills: 0 | Status: SHIPPED | OUTPATIENT
Start: 2023-11-03

## 2023-11-03 NOTE — ASSESSMENT & PLAN NOTE
I tried to find out if I could give her help with referral to resources. Encouraged exercise and spending time outdoors for the aid in relaxing.
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
## 2023-12-24 ENCOUNTER — HOSPITAL ENCOUNTER (EMERGENCY)
Age: 28
Discharge: HOME | End: 2023-12-24
Attending: EMERGENCY MEDICINE
Payer: COMMERCIAL

## 2023-12-24 VITALS
HEIGHT: 59 IN | OXYGEN SATURATION: 97 % | HEART RATE: 88 BPM | DIASTOLIC BLOOD PRESSURE: 62 MMHG | SYSTOLIC BLOOD PRESSURE: 121 MMHG | TEMPERATURE: 98.5 F | WEIGHT: 175 LBS | BODY MASS INDEX: 35.28 KG/M2 | RESPIRATION RATE: 14 BRPM

## 2023-12-24 DIAGNOSIS — J03.90 ACUTE TONSILLITIS, UNSPECIFIED ETIOLOGY: Primary | ICD-10-CM

## 2023-12-24 PROCEDURE — 87430 STREP A AG IA: CPT

## 2023-12-24 PROCEDURE — 99283 EMERGENCY DEPT VISIT LOW MDM: CPT

## 2023-12-24 PROCEDURE — 87081 CULTURE SCREEN ONLY: CPT

## 2023-12-24 RX ORDER — DEXAMETHASONE 4 MG/1
10 TABLET ORAL ONCE
Status: DISCONTINUED | OUTPATIENT
Start: 2023-12-24 | End: 2023-12-24

## 2023-12-24 NOTE — ED NOTES
Discharge instructions were reviewed and the patient will follow up with Dr. Elisabeth Portillo as needed. Salt water gargles were discussed. The patient voiced understanding.

## 2023-12-24 NOTE — ED PROVIDER NOTES
enlargement bilaterally without visible exudate. Uvula midline. Mucosal membranes moist with no mucosal lesions. NECK: Supple. Trachea midline. No tender adenopathy. CARDIO: RRR. Radial pulse 2+. LUNGS: Respirations unlabored. CTAB. ABDOMEN: Soft. Non-distended. Non-tender. EXTREMITIES: No acute deformities. SKIN: Warm and dry. NEUROLOGICAL: No gross facial drooping. Moves all 4 extremities spontaneously. PSYCHIATRIC: Normal mood. Labs:  Results for orders placed or performed during the hospital encounter of 12/24/23   Strep Screen Group A Throat    Specimen: Throat   Result Value Ref Range    Specimen THROAT     Special Requests NONE     Strep A Direct Screen NEGATIVE        CC/HPI Summary, DDx, ED Course, and Reassessment: Patient is overall well-appearing, tolerating p.o., phonating normally. Is hemodynamically stable. Rapid strep obtained and is negative. Suspect likely viral origin of tonsillitis. History from : Patient    Patient was given the following medications:  Medications - No data to display      Disposition Considerations (tests considered but not done, Shared Decision Making, Pt Expectation of Test or Tx.):     I think patient is appropriate for outpatient management. Patient is given instructions regarding symptomatic care at home as well as return precautions. To call PCP for follow up in 2-3 days. Patient verbalizes understanding of all instructions and is comfortable with the plan of care. They are discharged in stable condition. I am the Primary Clinician of Record. Final Impression:  1.  Acute tonsillitis, unspecified etiology      DISPOSITION Decision To Discharge 12/24/2023 08:58:45 AM      Patient referred to:  Fede Kaplan MD  [address] [place] [address]  [phone]    Schedule an appointment as soon as possible for a visit in 2 days      [address]  [phone]    If symptoms

## 2023-12-24 NOTE — DISCHARGE INSTR - COC
[id number]    Treatments at the Time of Hospital Discharge:   Respiratory Treatments: ***  Oxygen Therapy:  {Therapy; copd oxygen:03244}  Ventilator:    {[facility] Vent CZZZ:496335588}    Rehab Therapies: {THERAPEUTIC INTERVENTION:1832452071}  Weight Bearing Status/Restrictions: {[facility] Weight Bearin}  Other Medical Equipment (for information only, NOT a DME order):  {EQUIPMENT:180963605}  Other Treatments: ***    Patient's personal belongings (please select all that are sent with patient):  {[facility] DME Belongings:161977474}    RN SIGNATURE:  {Esignature:837752716}    CASE MANAGEMENT/SOCIAL WORK SECTION    Inpatient Status Date: ***    Readmission Risk Assessment Score:  Readmission Risk              Risk of Unplanned Readmission:  0           Discharging to Facility/ Agency   Name:   Address:  Phone:  Fax:    Dialysis Facility (if applicable)   Name:  Address:  Dialysis Schedule:  Phone:  Fax:    / signature: {Esignature:[id number]}    PHYSICIAN SECTION    Prognosis: {Prognosis:6043407797}    Condition at Discharge: [address] Patient Condition:059607664}    Rehab Potential (if transferring to Rehab): {Prognosis:7916720338}    Recommended Labs or Other Treatments After Discharge: ***    Physician Certification: I certify the above information and transfer of Nadine Hernandez  is necessary for the continuing treatment of the diagnosis listed and that she requires {Admit to Appropriate Level of Care:98140} for {GREATER/LESS:536278108} 30 days.      Update Admission H&P: [facility]}    PHYSICIAN SIGNATURE:  {Esignature:366341113}

## 2023-12-24 NOTE — ED NOTES
The patient presents to the er today with complaints of a sore throat since yesterday. The call light is within reach.

## 2023-12-26 LAB
CULTURE: NORMAL
Lab: NORMAL
SPECIMEN: NORMAL
STREP A DIRECT SCREEN: NEGATIVE

## 2024-03-22 ENCOUNTER — TELEPHONE (OUTPATIENT)
Dept: FAMILY MEDICINE CLINIC | Age: 29
End: 2024-03-22

## 2024-03-22 NOTE — TELEPHONE ENCOUNTER
I made a letter.  I did check PDMP and it looks like she only got 2 prescriptions for Vyvanse during the year of 2023.

## 2024-03-22 NOTE — TELEPHONE ENCOUNTER
Patient called stating she applied to a new college and they are requesting a letter just stating she has ADHD incase it affects her schooling.

## 2024-05-01 ENCOUNTER — OFFICE VISIT (OUTPATIENT)
Dept: FAMILY MEDICINE CLINIC | Age: 29
End: 2024-05-01
Payer: COMMERCIAL

## 2024-05-01 VITALS
WEIGHT: 189.4 LBS | OXYGEN SATURATION: 99 % | HEART RATE: 85 BPM | DIASTOLIC BLOOD PRESSURE: 84 MMHG | SYSTOLIC BLOOD PRESSURE: 114 MMHG | BODY MASS INDEX: 38.25 KG/M2

## 2024-05-01 DIAGNOSIS — L02.91 ABSCESS: Primary | ICD-10-CM

## 2024-05-01 DIAGNOSIS — B35.1 ONYCHOMYCOSIS: ICD-10-CM

## 2024-05-01 PROCEDURE — G8417 CALC BMI ABV UP PARAM F/U: HCPCS | Performed by: FAMILY MEDICINE

## 2024-05-01 PROCEDURE — 1036F TOBACCO NON-USER: CPT | Performed by: FAMILY MEDICINE

## 2024-05-01 PROCEDURE — 99213 OFFICE O/P EST LOW 20 MIN: CPT | Performed by: FAMILY MEDICINE

## 2024-05-01 PROCEDURE — G8427 DOCREV CUR MEDS BY ELIG CLIN: HCPCS | Performed by: FAMILY MEDICINE

## 2024-05-01 RX ORDER — SULFAMETHOXAZOLE AND TRIMETHOPRIM 800; 160 MG/1; MG/1
1 TABLET ORAL 2 TIMES DAILY
Qty: 20 TABLET | Refills: 0 | Status: SHIPPED | OUTPATIENT
Start: 2024-05-01 | End: 2024-05-11

## 2024-05-01 ASSESSMENT — ENCOUNTER SYMPTOMS
CONSTIPATION: 0
NAUSEA: 0
VOMITING: 0
DIARRHEA: 0

## 2024-05-01 NOTE — PROGRESS NOTES
5/1/24    Kathy Johnson  1995    FRANCISCO Chavez is a 28 y.o. female who presents today for evaluation of:  Chief Complaint   Patient presents with    OTHER     Patient states ingrown hair on fold of groin- growing and is not the size of a quarter- red inflamed      Abscess or ingrown hair in groin x 1 week.  She thinks it may have started from an ingrown hair.    Toenail fungus: She has been treating toenail fungus is with a nonprescription remedy that is causing color change of the nail but not making the fungus go away completely.  The nonprescription remedy has a combination of the ingredients including acetic acid and several other ingredients.    Review of Systems   Constitutional:  Negative for fever.   Gastrointestinal:  Negative for constipation, diarrhea, nausea and vomiting.   Endocrine: Negative for polyuria.   Genitourinary:  Negative for dysuria and hematuria.         Allergies   Allergen Reactions    Amoxicillin         OBJECTIVE    /84 (Site: Left Upper Arm, Position: Sitting, Cuff Size: Large Adult)   Pulse 85   Wt 85.9 kg (189 lb 6.4 oz)   SpO2 99%   BMI 38.25 kg/m²     Physical Exam   Constitutional:       General: Not in acute distress.     Appearance: Normal appearance. Not ill-appearing.   Eyes:      General: No scleral icterus.  Pulmonary:      Effort: Pulmonary effort is normal. No respiratory distress.    Musculoskeletal:     Moves all extremities normally.    Skin:     General: Skin is warm.      Coloration: Skin is not jaundiced.   Toenails have mild superficial fungal involvement.  Chaperoned exam: She has an area about 2 cm diameter with erythema and some flocculence in the center on the right side of her pubic area.  It is somewhat tender.  There are no red streaks and there is no evidence of surrounding cellulitis.  Neurological:      Mental Status: Patient is alert.   Psychiatric:         Behavior: Behavior normal.         Thought Content: Thought

## 2024-05-01 NOTE — ASSESSMENT & PLAN NOTE
She has an abscess slightly to the right in the pubic area with no evidence of surrounding cellulitis.  I explained that the recommended treatment would be incision and drainage and gave her the option of the procedure being done by myself or by a surgical specialist.  She would rather start treating it with antibiotics today and follow-up in the short-term to get the I&D.  I will prescribe Bactrim DS.  I recommended warm heating pads and if she desires remedies that claimed to draw infection to a head.  I especially recommended heating pads.  She has a relationship with a gynecologist and I will put in a referral to the gynecologist for definitive treatment.

## 2024-05-01 NOTE — ASSESSMENT & PLAN NOTE
Recommended trying a nonprescription remedy that has tolnaftate 1% and explained that she would need to plan to be very patient and use it perhaps every other day for 6 months and then possibly after that continue to use it once a week.

## 2024-07-08 ENCOUNTER — HOSPITAL ENCOUNTER (EMERGENCY)
Age: 29
Discharge: HOME | End: 2024-07-08
Attending: STUDENT IN AN ORGANIZED HEALTH CARE EDUCATION/TRAINING PROGRAM
Payer: COMMERCIAL

## 2024-07-08 VITALS
HEART RATE: 80 BPM | TEMPERATURE: 97.6 F | SYSTOLIC BLOOD PRESSURE: 123 MMHG | RESPIRATION RATE: 18 BRPM | WEIGHT: 185 LBS | HEIGHT: 59 IN | DIASTOLIC BLOOD PRESSURE: 83 MMHG | BODY MASS INDEX: 37.29 KG/M2 | OXYGEN SATURATION: 98 %

## 2024-07-08 DIAGNOSIS — S39.012A STRAIN OF LUMBAR REGION, INITIAL ENCOUNTER: Primary | ICD-10-CM

## 2024-07-08 PROCEDURE — 96372 THER/PROPH/DIAG INJ SC/IM: CPT

## 2024-07-08 PROCEDURE — 99284 EMERGENCY DEPT VISIT MOD MDM: CPT

## 2024-07-08 PROCEDURE — 6370000000 HC RX 637 (ALT 250 FOR IP): Performed by: STUDENT IN AN ORGANIZED HEALTH CARE EDUCATION/TRAINING PROGRAM

## 2024-07-08 PROCEDURE — 6360000002 HC RX W HCPCS: Performed by: STUDENT IN AN ORGANIZED HEALTH CARE EDUCATION/TRAINING PROGRAM

## 2024-07-08 RX ORDER — LIDOCAINE 4 G/G
1 PATCH TOPICAL
Status: DISCONTINUED | OUTPATIENT
Start: 2024-07-08 | End: 2024-07-08 | Stop reason: HOSPADM

## 2024-07-08 RX ORDER — CYCLOBENZAPRINE HCL 10 MG
10 TABLET ORAL ONCE
Status: COMPLETED | OUTPATIENT
Start: 2024-07-08 | End: 2024-07-08

## 2024-07-08 RX ORDER — CYCLOBENZAPRINE HCL 10 MG
10 TABLET ORAL 3 TIMES DAILY PRN
Qty: 21 TABLET | Refills: 0 | Status: SHIPPED | OUTPATIENT
Start: 2024-07-08 | End: 2024-07-18

## 2024-07-08 RX ORDER — KETOROLAC TROMETHAMINE 30 MG/ML
30 INJECTION, SOLUTION INTRAMUSCULAR; INTRAVENOUS ONCE
Status: COMPLETED | OUTPATIENT
Start: 2024-07-08 | End: 2024-07-08

## 2024-07-08 RX ORDER — LIDOCAINE 50 MG/G
1 PATCH TOPICAL DAILY
Qty: 10 PATCH | Refills: 0 | Status: SHIPPED | OUTPATIENT
Start: 2024-07-08 | End: 2024-07-18

## 2024-07-08 RX ADMIN — KETOROLAC TROMETHAMINE 30 MG: 30 INJECTION, SOLUTION INTRAMUSCULAR; INTRAVENOUS at 17:25

## 2024-07-08 RX ADMIN — CYCLOBENZAPRINE 10 MG: 10 TABLET, FILM COATED ORAL at 17:24

## 2024-07-08 ASSESSMENT — PAIN DESCRIPTION - ORIENTATION: ORIENTATION: RIGHT;LOWER

## 2024-07-08 ASSESSMENT — PAIN DESCRIPTION - DESCRIPTORS: DESCRIPTORS: SHARP;SHOOTING

## 2024-07-08 ASSESSMENT — PAIN DESCRIPTION - LOCATION: LOCATION: BACK

## 2024-07-08 ASSESSMENT — PAIN SCALES - GENERAL: PAINLEVEL_OUTOF10: 9

## 2024-07-08 ASSESSMENT — PAIN - FUNCTIONAL ASSESSMENT: PAIN_FUNCTIONAL_ASSESSMENT: PREVENTS OR INTERFERES SOME ACTIVE ACTIVITIES AND ADLS

## 2024-07-08 NOTE — ED PROVIDER NOTES
use: Never    Sexual activity: Not on file   Other Topics Concern    Not on file   Social History Narrative    Not on file     Social Determinants of Health     Financial Resource Strain: Not on file   Food Insecurity: Not on file   Transportation Needs: Not on file   Physical Activity: Not on file   Stress: Not on file   Social Connections: Not on file   Intimate Partner Violence: Not on file   Housing Stability: Not on file     Medications:   Current Facility-Administered Medications   Medication Dose Route Frequency Provider Last Rate Last Admin    lidocaine 4 % external patch 1 patch  1 patch TransDERmal NOW Pauline Lanier, DO   1 patch at 07/08/24 1486     Current Outpatient Medications   Medication Sig Dispense Refill    cyclobenzaprine (FLEXERIL) 10 MG tablet Take 1 tablet by mouth 3 times daily as needed for Muscle spasms 21 tablet 0    lidocaine (LIDODERM) 5 % Place 1 patch onto the skin daily for 10 days 12 hours on, 12 hours off. 10 patch 0    hydrOXYzine pamoate (VISTARIL) 25 MG capsule Take 1 capsule by mouth 4 times daily as needed for Anxiety 60 capsule 0    albuterol sulfate HFA (VENTOLIN HFA) 108 (90 Base) MCG/ACT inhaler Inhale 2 puffs into the lungs 4 times daily as needed for Wheezing 18 g 5    lisdexamfetamine (VYVANSE) 30 MG capsule Take 1 capsule by mouth daily for 30 days. Max Daily Amount: 30 mg (Patient not taking: Reported on 5/1/2024) 30 capsule 0    folic acid (FOLVITE) 1 MG tablet Take 1 tablet by mouth daily (Patient not taking: Reported on 12/24/2023) 90 tablet 3    Cholecalciferol (VITAMIN D3) 50 MCG (2000 UT) TABS Take 1 tablet by mouth daily (Patient not taking: Reported on 12/24/2023) 90 tablet 3    hydrOXYzine HCl (ATARAX) 25 MG tablet Take 1 tablet by mouth every 8 hours as needed for Itching 60 tablet 0    amphetamine-dextroamphetamine (ADDERALL, 10MG,) 10 MG tablet Take 1 tablet by mouth 2 times daily for 30 days. Max Daily Amount: 20 mg (Patient not taking: Reported on

## 2024-08-22 ENCOUNTER — OFFICE VISIT (OUTPATIENT)
Dept: FAMILY MEDICINE CLINIC | Age: 29
End: 2024-08-22
Payer: COMMERCIAL

## 2024-08-22 VITALS
WEIGHT: 200.8 LBS | DIASTOLIC BLOOD PRESSURE: 70 MMHG | HEART RATE: 102 BPM | SYSTOLIC BLOOD PRESSURE: 110 MMHG | BODY MASS INDEX: 40.56 KG/M2 | OXYGEN SATURATION: 98 %

## 2024-08-22 DIAGNOSIS — E53.8 FOLATE DEFICIENCY: ICD-10-CM

## 2024-08-22 DIAGNOSIS — L02.91 ABSCESS: Primary | ICD-10-CM

## 2024-08-22 DIAGNOSIS — Z90.3 HISTORY OF SLEEVE GASTRECTOMY: ICD-10-CM

## 2024-08-22 DIAGNOSIS — Z86.2 HISTORY OF ANEMIA: ICD-10-CM

## 2024-08-22 DIAGNOSIS — F90.0 ATTENTION DEFICIT HYPERACTIVITY DISORDER (ADHD), PREDOMINANTLY INATTENTIVE TYPE: Chronic | ICD-10-CM

## 2024-08-22 DIAGNOSIS — E55.9 VITAMIN D DEFICIENCY: ICD-10-CM

## 2024-08-22 DIAGNOSIS — D50.9 IRON DEFICIENCY ANEMIA, UNSPECIFIED IRON DEFICIENCY ANEMIA TYPE: ICD-10-CM

## 2024-08-22 DIAGNOSIS — R53.83 FATIGUE, UNSPECIFIED TYPE: ICD-10-CM

## 2024-08-22 DIAGNOSIS — Z98.84 S/P BARIATRIC SURGERY: ICD-10-CM

## 2024-08-22 DIAGNOSIS — E66.01 CLASS 3 SEVERE OBESITY DUE TO EXCESS CALORIES WITHOUT SERIOUS COMORBIDITY WITH BODY MASS INDEX (BMI) OF 40.0 TO 44.9 IN ADULT (HCC): ICD-10-CM

## 2024-08-22 PROBLEM — E66.813 CLASS 3 SEVERE OBESITY DUE TO EXCESS CALORIES WITHOUT SERIOUS COMORBIDITY WITH BODY MASS INDEX (BMI) OF 40.0 TO 44.9 IN ADULT: Status: ACTIVE | Noted: 2022-07-06

## 2024-08-22 PROCEDURE — G8427 DOCREV CUR MEDS BY ELIG CLIN: HCPCS | Performed by: FAMILY MEDICINE

## 2024-08-22 PROCEDURE — 1036F TOBACCO NON-USER: CPT | Performed by: FAMILY MEDICINE

## 2024-08-22 PROCEDURE — G8417 CALC BMI ABV UP PARAM F/U: HCPCS | Performed by: FAMILY MEDICINE

## 2024-08-22 PROCEDURE — 36415 COLL VENOUS BLD VENIPUNCTURE: CPT | Performed by: FAMILY MEDICINE

## 2024-08-22 RX ORDER — LISDEXAMFETAMINE DIMESYLATE 20 MG/1
20 CAPSULE ORAL DAILY
Qty: 30 CAPSULE | Refills: 0 | Status: SHIPPED | OUTPATIENT
Start: 2024-10-21 | End: 2024-11-20

## 2024-08-22 RX ORDER — LISDEXAMFETAMINE DIMESYLATE 20 MG/1
20 CAPSULE ORAL DAILY
Qty: 30 CAPSULE | Refills: 0 | Status: SHIPPED | OUTPATIENT
Start: 2024-09-21 | End: 2024-10-21

## 2024-08-22 RX ORDER — SULFAMETHOXAZOLE AND TRIMETHOPRIM 800; 160 MG/1; MG/1
1 TABLET ORAL 2 TIMES DAILY
Qty: 20 TABLET | Refills: 0 | Status: SHIPPED | OUTPATIENT
Start: 2024-08-22 | End: 2024-09-01

## 2024-08-22 RX ORDER — LISDEXAMFETAMINE DIMESYLATE 20 MG/1
20 CAPSULE ORAL DAILY
Qty: 30 CAPSULE | Refills: 0 | Status: SHIPPED | OUTPATIENT
Start: 2024-08-22 | End: 2024-09-21

## 2024-08-22 NOTE — PROGRESS NOTES
vitamins which she states has not been done recently.  7. Vitamin D deficiency  -     Vitamin D 25 Hydroxy  8. History of anemia  Assessment & Plan:   Will check CBC, iron.  Note that anemia or low iron might be the reason for her lack of motivation rather than the dosing of the Vyvanse.  Orders:  -     CBC with Auto Differential  -     Ferritin  -     Iron and TIBC  9. Folate deficiency  Assessment & Plan:   Will check a B12 and folate.  Orders:  -     Vitamin B12 & Folate    On this date I have spent 32 minutes reviewing previous notes, test results and face to face (virtual) with the patient discussing the diagnosis and importance of compliance with the treatment plan as well as documenting on the day of the visit.  (Time for ECG interpretation or other separately billed interpretation not included in my total minutes.)        Return in about 2 months (around 10/22/2024) for ADHD-f/u& WAEL.   Konrad Elizabeth MD

## 2024-08-22 NOTE — ASSESSMENT & PLAN NOTE
Encouraged avoiding liquid calories with the possible exception of protein shakes.  Encouraged getting back to you consuming healthy foods.  Encouraged exercise.

## 2024-08-22 NOTE — ASSESSMENT & PLAN NOTE
Will check CBC, iron.  Note that anemia or low iron might be the reason for her lack of motivation rather than the dosing of the Vyvanse.

## 2024-08-22 NOTE — ASSESSMENT & PLAN NOTE
She felt that the Vyvanse caused her mind to go blank or to lose motivation.  She thinks a 20 mg dose might be better.  In the past she used Adderall and did not find benefit from it.  I will prescribe 20 mg Vyvanse as a trial.

## 2024-08-22 NOTE — ASSESSMENT & PLAN NOTE
Status post sleeve gastrectomy and I will order labs to monitor vitamins which she states has not been done recently.

## 2024-08-22 NOTE — ASSESSMENT & PLAN NOTE
I will prescribe an antibiotic for the abscess.  Recommended that she see her gynecology doctor for it as well.  Described how abscesses form under the skin and need to drain and then heal from the inside out.

## 2024-08-23 LAB
25(OH)D3 SERPL-MCNC: 18.9 NG/ML
ALBUMIN SERPL-MCNC: 4.2 G/DL (ref 3.4–5)
ALBUMIN/GLOB SERPL: 1.8 {RATIO} (ref 1.1–2.2)
ALP SERPL-CCNC: 84 U/L (ref 40–129)
ALT SERPL-CCNC: 11 U/L (ref 10–40)
ANION GAP SERPL CALCULATED.3IONS-SCNC: 12 MMOL/L (ref 3–16)
AST SERPL-CCNC: 16 U/L (ref 15–37)
BASOPHILS # BLD: 0 K/UL (ref 0–0.2)
BASOPHILS NFR BLD: 0.7 %
BILIRUB SERPL-MCNC: 0.4 MG/DL (ref 0–1)
BUN SERPL-MCNC: 16 MG/DL (ref 7–20)
CALCIUM SERPL-MCNC: 8.9 MG/DL (ref 8.3–10.6)
CHLORIDE SERPL-SCNC: 106 MMOL/L (ref 99–110)
CO2 SERPL-SCNC: 24 MMOL/L (ref 21–32)
CREAT SERPL-MCNC: 0.6 MG/DL (ref 0.6–1.1)
DEPRECATED RDW RBC AUTO: 16.6 % (ref 12.4–15.4)
EOSINOPHIL # BLD: 0.3 K/UL (ref 0–0.6)
EOSINOPHIL NFR BLD: 5.9 %
FERRITIN SERPL IA-MCNC: 3.7 NG/ML (ref 15–150)
FOLATE SERPL-MCNC: 4.47 NG/ML (ref 4.78–24.2)
GFR SERPLBLD CREATININE-BSD FMLA CKD-EPI: >90 ML/MIN/{1.73_M2}
GLUCOSE SERPL-MCNC: 74 MG/DL (ref 70–99)
HCT VFR BLD AUTO: 32.9 % (ref 36–48)
HGB BLD-MCNC: 10.9 G/DL (ref 12–16)
IRON SATN MFR SERPL: 6 % (ref 15–50)
IRON SERPL-MCNC: 25 UG/DL (ref 37–145)
LYMPHOCYTES # BLD: 1.4 K/UL (ref 1–5.1)
LYMPHOCYTES NFR BLD: 25.9 %
MCH RBC QN AUTO: 25.3 PG (ref 26–34)
MCHC RBC AUTO-ENTMCNC: 33.1 G/DL (ref 31–36)
MCV RBC AUTO: 76.6 FL (ref 80–100)
MONOCYTES # BLD: 0.3 K/UL (ref 0–1.3)
MONOCYTES NFR BLD: 5.4 %
NEUTROPHILS # BLD: 3.4 K/UL (ref 1.7–7.7)
NEUTROPHILS NFR BLD: 62.1 %
PLATELET # BLD AUTO: 243 K/UL (ref 135–450)
PMV BLD AUTO: 10.4 FL (ref 5–10.5)
POTASSIUM SERPL-SCNC: 3.9 MMOL/L (ref 3.5–5.1)
PROT SERPL-MCNC: 6.6 G/DL (ref 6.4–8.2)
RBC # BLD AUTO: 4.3 M/UL (ref 4–5.2)
SODIUM SERPL-SCNC: 142 MMOL/L (ref 136–145)
TIBC SERPL-MCNC: 446 UG/DL (ref 260–445)
TSH SERPL DL<=0.005 MIU/L-ACNC: 2.99 UIU/ML (ref 0.27–4.2)
VIT B12 SERPL-MCNC: 490 PG/ML (ref 211–911)
WBC # BLD AUTO: 5.5 K/UL (ref 4–11)

## 2024-08-23 RX ORDER — FOLIC ACID 1 MG/1
1 TABLET ORAL DAILY
Qty: 90 TABLET | Refills: 3 | Status: SHIPPED | OUTPATIENT
Start: 2024-08-23

## 2024-08-23 RX ORDER — FERROUS SULFATE 325(65) MG
325 TABLET ORAL 2 TIMES DAILY WITH MEALS
Qty: 60 TABLET | Refills: 4 | Status: SHIPPED | OUTPATIENT
Start: 2024-08-23

## 2024-08-23 RX ORDER — CHOLECALCIFEROL (VITAMIN D3) 50 MCG
1 TABLET ORAL DAILY
Qty: 90 TABLET | Refills: 3 | Status: SHIPPED | OUTPATIENT
Start: 2024-08-23

## 2024-10-30 ENCOUNTER — TELEMEDICINE (OUTPATIENT)
Dept: FAMILY MEDICINE CLINIC | Age: 29
End: 2024-10-30
Payer: COMMERCIAL

## 2024-10-30 DIAGNOSIS — J45.31 MILD PERSISTENT ASTHMA WITH ACUTE EXACERBATION: ICD-10-CM

## 2024-10-30 PROCEDURE — 99214 OFFICE O/P EST MOD 30 MIN: CPT | Performed by: FAMILY MEDICINE

## 2024-10-30 PROCEDURE — G8428 CUR MEDS NOT DOCUMENT: HCPCS | Performed by: FAMILY MEDICINE

## 2024-10-30 PROCEDURE — 1036F TOBACCO NON-USER: CPT | Performed by: FAMILY MEDICINE

## 2024-10-30 PROCEDURE — G8484 FLU IMMUNIZE NO ADMIN: HCPCS | Performed by: FAMILY MEDICINE

## 2024-10-30 PROCEDURE — G8417 CALC BMI ABV UP PARAM F/U: HCPCS | Performed by: FAMILY MEDICINE

## 2024-10-30 RX ORDER — ALBUTEROL SULFATE 90 UG/1
2 INHALANT RESPIRATORY (INHALATION) 4 TIMES DAILY PRN
Qty: 18 G | Refills: 5 | Status: SHIPPED | OUTPATIENT
Start: 2024-10-30

## 2024-10-30 RX ORDER — PREDNISONE 20 MG/1
TABLET ORAL
Qty: 16 TABLET | Refills: 0 | Status: SHIPPED | OUTPATIENT
Start: 2024-10-30 | End: 2024-11-08

## 2024-10-30 NOTE — PROGRESS NOTES
Kathy Johnson, was evaluated through a synchronous (real-time) audio-video encounter. The patient (or guardian if applicable) is aware that this is a billable service, which includes applicable co-pays. This Virtual Visit was conducted with patient's (and/or legal guardian's) consent. Patient identification was verified, and a caregiver was present when appropriate.   The patient was located at Home: [address] [place] [address].  [place] [id number]  Provider was located at Facility (Appt Dept): [address] [place] [address]  Confirm you are appropriately licensed, registered, or certified to deliver care in the state where the patient is located as indicated above. If you are not or unsure, please re-schedule the visit: Yes, I confirm.     Kathy Johnson (:  1995) is a Established patient, presenting virtually for evaluation of the following:      Below is the assessment and plan developed based on review of pertinent history, physical exam, labs, studies, and medications.     Assessment & Plan  Mild persistent asthma with acute exacerbation  Her asthma is flaring up.  I will treat the flare with a prednisone burst and also prescribe Dulera.  I will write for refills of the albuterol sulfate inhaler that she needs.  She does not need refills of albuterol for her home nebulizer at this time.  I told her that if she is getting worse that she should go to urgent care or make an appointment with us or perhaps go to the emergency department.  I stressed that since she seems to be talking in short sentences.    Orders:    albuterol sulfate HFA (VENTOLIN HFA) 108 (90 Base) MCG/ACT inhaler; Inhale 2 puffs into the lungs 4 times daily as needed for Wheezing    predniSONE (DELTASONE) 20 MG tablet; Take 1 tablet by mouth in the morning, at noon, and at bedtime for 1 day, THEN 1 tablet 2 times daily for 5 days, THEN 1 tablet daily for 3 days.    mometasone-formoterol (DULERA) 200-5 MCG/ACT inhaler; Inhale 2 puffs into

## 2024-10-30 NOTE — ASSESSMENT & PLAN NOTE
Her asthma is flaring up.  I will treat the flare with a prednisone burst and also prescribe Dulera.  I will write for refills of the albuterol sulfate inhaler that she needs.  She does not need refills of albuterol for her home nebulizer at this time.  I told her that if she is getting worse that she should go to urgent care or make an appointment with us or perhaps go to the emergency department.  I stressed that since she seems to be talking in short sentences.    Orders:    albuterol sulfate HFA (VENTOLIN HFA) 108 (90 Base) MCG/ACT inhaler; Inhale 2 puffs into the lungs 4 times daily as needed for Wheezing    predniSONE (DELTASONE) 20 MG tablet; Take 1 tablet by mouth in the morning, at noon, and at bedtime for 1 day, THEN 1 tablet 2 times daily for 5 days, THEN 1 tablet daily for 3 days.    mometasone-formoterol (DULERA) 200-5 MCG/ACT inhaler; Inhale 2 puffs into the lungs in the morning and 2 puffs in the evening.

## 2024-10-31 ENCOUNTER — TELEMEDICINE (OUTPATIENT)
Dept: FAMILY MEDICINE CLINIC | Age: 29
End: 2024-10-31
Payer: COMMERCIAL

## 2024-10-31 DIAGNOSIS — J45.40 MODERATE PERSISTENT ASTHMA WITHOUT COMPLICATION: ICD-10-CM

## 2024-10-31 DIAGNOSIS — F90.0 ATTENTION DEFICIT HYPERACTIVITY DISORDER (ADHD), PREDOMINANTLY INATTENTIVE TYPE: Primary | Chronic | ICD-10-CM

## 2024-10-31 PROCEDURE — G8428 CUR MEDS NOT DOCUMENT: HCPCS | Performed by: FAMILY MEDICINE

## 2024-10-31 PROCEDURE — 1036F TOBACCO NON-USER: CPT | Performed by: FAMILY MEDICINE

## 2024-10-31 PROCEDURE — G8417 CALC BMI ABV UP PARAM F/U: HCPCS | Performed by: FAMILY MEDICINE

## 2024-10-31 PROCEDURE — 99213 OFFICE O/P EST LOW 20 MIN: CPT | Performed by: FAMILY MEDICINE

## 2024-10-31 PROCEDURE — G8484 FLU IMMUNIZE NO ADMIN: HCPCS | Performed by: FAMILY MEDICINE

## 2024-10-31 RX ORDER — BUDESONIDE AND FORMOTEROL FUMARATE DIHYDRATE 160; 4.5 UG/1; UG/1
2 AEROSOL RESPIRATORY (INHALATION) 2 TIMES DAILY
Qty: 10.2 G | Refills: 5 | Status: SHIPPED | OUTPATIENT
Start: 2024-10-31

## 2024-10-31 NOTE — ASSESSMENT & PLAN NOTE
I got a message that insurance would not cover the Dulera I prescribed yesterday.  I called her up and talked to her and she stated that she had not gotten the Dulera.  Nevertheless she is breathing better today with the benefit of albuterol.  She was breathing so much better at the visit earlier this morning that she forgot to mention it.  Since albuterol by itself is not adequate for controlling asthma I told her that we did want to prescribe a medication in the medication class as the Dulera and so I will try sending a prescription for budesonide/formoterol.  I will send it to [facility] on [address] and I told her that at the phone call today.    Orders:    budesonide-formoterol (SYMBICORT) 160-4.5 MCG/ACT AERO; Inhale 2 puffs into the lungs 2 times daily

## 2024-10-31 NOTE — ASSESSMENT & PLAN NOTE
I made a note saying that she has ADHD that made it overwhelming for her to stay in school classes.

## 2024-10-31 NOTE — PROGRESS NOTES
Kathy Johnson, was evaluated through a synchronous (real-time) audio-video encounter. The patient (or guardian if applicable) is aware that this is a billable service, which includes applicable co-pays. This Virtual Visit was conducted with patient's (and/or legal guardian's) consent. Patient identification was verified, and a caregiver was present when appropriate.   The patient was located at Other: [place]  Provider was located at Facility (Appt Dept): [address] [place] [address]  Confirm you are appropriately licensed, registered, or certified to deliver care in the state where the patient is located as indicated above. If you are not or unsure, please re-schedule the visit: Yes, I confirm.     Kathy Johnson (:  1995) is a Established patient, presenting virtually for evaluation of the following:      Below is the assessment and plan developed based on review of pertinent history, physical exam, labs, studies, and medications.     Assessment & Plan  Attention deficit hyperactivity disorder (ADHD), predominantly inattentive type  I made a note saying that she has ADHD that made it overwhelming for her to stay in school classes.         Moderate persistent asthma without complication   I got a message that insurance would not cover the Dulera I prescribed yesterday.  I called her up and talked to her and she stated that she had not gotten the Dulera.  Nevertheless she is breathing better today with the benefit of albuterol.  She was breathing so much better at the visit earlier this morning that she forgot to mention it.  Since albuterol by itself is not adequate for controlling asthma I told her that we did want to prescribe a medication in the medication class as the Dulera and so I will try sending a prescription for budesonide/formoterol.  I will send it to [facility] on [address] and I told her that at the phone call today.    Orders:    budesonide-formoterol (SYMBICORT) 160-4.5

## 2024-10-31 NOTE — ADDENDUM NOTE
Addended by: JOSE G DALTON on: 10/31/2024 05:52 PM     Modules accepted: Orders, Level of Service

## 2025-01-13 ENCOUNTER — OFFICE VISIT (OUTPATIENT)
Dept: FAMILY MEDICINE CLINIC | Age: 30
End: 2025-01-13
Payer: COMMERCIAL

## 2025-01-13 VITALS
HEART RATE: 94 BPM | WEIGHT: 205.2 LBS | SYSTOLIC BLOOD PRESSURE: 116 MMHG | BODY MASS INDEX: 41.45 KG/M2 | OXYGEN SATURATION: 99 % | DIASTOLIC BLOOD PRESSURE: 70 MMHG

## 2025-01-13 DIAGNOSIS — D50.9 IRON DEFICIENCY ANEMIA, UNSPECIFIED IRON DEFICIENCY ANEMIA TYPE: ICD-10-CM

## 2025-01-13 DIAGNOSIS — J45.40 MODERATE PERSISTENT ASTHMA WITHOUT COMPLICATION: ICD-10-CM

## 2025-01-13 DIAGNOSIS — E55.9 VITAMIN D DEFICIENCY: ICD-10-CM

## 2025-01-13 DIAGNOSIS — L72.0 MILIA: ICD-10-CM

## 2025-01-13 DIAGNOSIS — E66.01 CLASS 3 SEVERE OBESITY DUE TO EXCESS CALORIES WITHOUT SERIOUS COMORBIDITY WITH BODY MASS INDEX (BMI) OF 40.0 TO 44.9 IN ADULT: Primary | ICD-10-CM

## 2025-01-13 DIAGNOSIS — E66.813 CLASS 3 SEVERE OBESITY DUE TO EXCESS CALORIES WITHOUT SERIOUS COMORBIDITY WITH BODY MASS INDEX (BMI) OF 40.0 TO 44.9 IN ADULT: Primary | ICD-10-CM

## 2025-01-13 DIAGNOSIS — L30.4 INTERTRIGO: ICD-10-CM

## 2025-01-13 DIAGNOSIS — F90.0 ATTENTION DEFICIT HYPERACTIVITY DISORDER (ADHD), PREDOMINANTLY INATTENTIVE TYPE: Chronic | ICD-10-CM

## 2025-01-13 DIAGNOSIS — Z90.3 HISTORY OF SLEEVE GASTRECTOMY: ICD-10-CM

## 2025-01-13 DIAGNOSIS — E53.8 FOLATE DEFICIENCY: ICD-10-CM

## 2025-01-13 PROCEDURE — G8417 CALC BMI ABV UP PARAM F/U: HCPCS | Performed by: FAMILY MEDICINE

## 2025-01-13 PROCEDURE — G8427 DOCREV CUR MEDS BY ELIG CLIN: HCPCS | Performed by: FAMILY MEDICINE

## 2025-01-13 PROCEDURE — 1036F TOBACCO NON-USER: CPT | Performed by: FAMILY MEDICINE

## 2025-01-13 PROCEDURE — 99214 OFFICE O/P EST MOD 30 MIN: CPT | Performed by: FAMILY MEDICINE

## 2025-01-13 RX ORDER — FOLIC ACID 1 MG/1
1 TABLET ORAL DAILY
Qty: 90 TABLET | Refills: 3 | Status: SHIPPED | OUTPATIENT
Start: 2025-01-13

## 2025-01-13 RX ORDER — NALTREXONE HYDROCHLORIDE 50 MG/1
25-50 TABLET, FILM COATED ORAL DAILY
Qty: 30 TABLET | Refills: 1 | Status: SHIPPED | OUTPATIENT
Start: 2025-01-13

## 2025-01-13 RX ORDER — LISDEXAMFETAMINE DIMESYLATE 30 MG/1
30 CAPSULE ORAL DAILY
Qty: 30 CAPSULE | Refills: 0 | Status: SHIPPED | OUTPATIENT
Start: 2025-01-13 | End: 2025-02-12

## 2025-01-13 RX ORDER — LISDEXAMFETAMINE DIMESYLATE 20 MG/1
20 CAPSULE ORAL DAILY
Qty: 30 CAPSULE | Refills: 0 | Status: CANCELLED | OUTPATIENT
Start: 2025-01-13 | End: 2025-02-12

## 2025-01-13 RX ORDER — FERROUS SULFATE 325(65) MG
325 TABLET ORAL 2 TIMES DAILY WITH MEALS
Qty: 60 TABLET | Refills: 5 | Status: SHIPPED | OUTPATIENT
Start: 2025-01-13

## 2025-01-13 RX ORDER — LISDEXAMFETAMINE DIMESYLATE 30 MG/1
30 CAPSULE ORAL DAILY
Qty: 30 CAPSULE | Refills: 0 | Status: SHIPPED | OUTPATIENT
Start: 2025-02-12 | End: 2025-03-14

## 2025-01-13 RX ORDER — LISDEXAMFETAMINE DIMESYLATE 30 MG/1
30 CAPSULE ORAL DAILY
Qty: 30 CAPSULE | Refills: 0 | Status: SHIPPED | OUTPATIENT
Start: 2025-03-14 | End: 2025-04-13

## 2025-01-13 RX ORDER — CHOLECALCIFEROL (VITAMIN D3) 50 MCG
1 TABLET ORAL DAILY
Qty: 90 TABLET | Refills: 3 | Status: SHIPPED | OUTPATIENT
Start: 2025-01-13

## 2025-01-13 SDOH — ECONOMIC STABILITY: FOOD INSECURITY: WITHIN THE PAST 12 MONTHS, THE FOOD YOU BOUGHT JUST DIDN'T LAST AND YOU DIDN'T HAVE MONEY TO GET MORE.: SOMETIMES TRUE

## 2025-01-13 SDOH — ECONOMIC STABILITY: FOOD INSECURITY: WITHIN THE PAST 12 MONTHS, YOU WORRIED THAT YOUR FOOD WOULD RUN OUT BEFORE YOU GOT MONEY TO BUY MORE.: SOMETIMES TRUE

## 2025-01-13 ASSESSMENT — PATIENT HEALTH QUESTIONNAIRE - PHQ9
2. FEELING DOWN, DEPRESSED OR HOPELESS: NOT AT ALL
SUM OF ALL RESPONSES TO PHQ QUESTIONS 1-9: 0
SUM OF ALL RESPONSES TO PHQ QUESTIONS 1-9: 0
SUM OF ALL RESPONSES TO PHQ9 QUESTIONS 1 & 2: 0
SUM OF ALL RESPONSES TO PHQ QUESTIONS 1-9: 0
1. LITTLE INTEREST OR PLEASURE IN DOING THINGS: NOT AT ALL
SUM OF ALL RESPONSES TO PHQ QUESTIONS 1-9: 0

## 2025-01-13 NOTE — PROGRESS NOTES
1/13/25    Kathy Johnson  1995    FRANCISCO Chavez is a 29 y.o. female who presents today for evaluation of:  Chief Complaint   Patient presents with    Weight Loss     Discuss weight loss management      Referral - General     Dermatology referral       History of Present Illness  The patient is a 29-year-old woman who presents to discuss weight management.    She underwent bariatric surgery a few years prior, which initially resulted in significant weight loss from 260 pounds to approximately 180 pounds. However, she has since experienced a weight gain of about 20 pounds, despite maintaining a diet of less than 40 grams of carbohydrates and achieving her protein intake goal of 80 grams on most days. Her diet includes low-carb bread and tortillas, and she occasionally consumes protein shakes. She rarely drinks juices. 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 Her physical activity includes regular gym workouts, cardio exercises three times a week, and weight training. She also consumes flavored water, diet soda, and Coke Zero, with an occasional indulgence in regular soda every few weeks. She relies on caffeine to manage headaches but does not consume black coffee or tea. She prefers green tea but is uncertain about its carbohydrate and sugar content. Prior to her weight loss surgery, she did not use any weight loss medications. She attributes her weight gain to impulse control issues and reports no current cravings for junk food. She expresses a preference for low-carb options and does not have a strong preference for sweet foods.     ADD:She is currently on Vyvanse for ADD and is open to adjusting her dosage. She recalls having no appetite when on a 30 mg dose of Vyvanse, which she attributes to concurrent consumption of energy drinks.    Her asthma is under control right now. There was a little bit of time last year where it was

## 2025-04-23 ENCOUNTER — TELEMEDICINE (OUTPATIENT)
Dept: FAMILY MEDICINE CLINIC | Age: 30
End: 2025-04-23
Payer: COMMERCIAL

## 2025-04-23 DIAGNOSIS — F90.0 ATTENTION DEFICIT HYPERACTIVITY DISORDER (ADHD), PREDOMINANTLY INATTENTIVE TYPE: Chronic | ICD-10-CM

## 2025-04-23 DIAGNOSIS — J45.31 MILD PERSISTENT ASTHMA WITH ACUTE EXACERBATION: Primary | ICD-10-CM

## 2025-04-23 PROCEDURE — G8428 CUR MEDS NOT DOCUMENT: HCPCS | Performed by: FAMILY MEDICINE

## 2025-04-23 PROCEDURE — G8417 CALC BMI ABV UP PARAM F/U: HCPCS | Performed by: FAMILY MEDICINE

## 2025-04-23 PROCEDURE — 1036F TOBACCO NON-USER: CPT | Performed by: FAMILY MEDICINE

## 2025-04-23 PROCEDURE — 99213 OFFICE O/P EST LOW 20 MIN: CPT | Performed by: FAMILY MEDICINE

## 2025-04-23 RX ORDER — BUDESONIDE AND FORMOTEROL FUMARATE DIHYDRATE 160; 4.5 UG/1; UG/1
2 AEROSOL RESPIRATORY (INHALATION) 2 TIMES DAILY
Qty: 10.2 G | Refills: 3 | Status: SHIPPED | OUTPATIENT
Start: 2025-04-23

## 2025-04-23 RX ORDER — ALBUTEROL SULFATE 90 UG/1
2 INHALANT RESPIRATORY (INHALATION) 4 TIMES DAILY PRN
Qty: 18 G | Refills: 5 | Status: SHIPPED | OUTPATIENT
Start: 2025-04-23

## 2025-04-23 NOTE — ASSESSMENT & PLAN NOTE
I will put out refills of albuterol for her asthma that is exacerbated by possibly a cold and possibly current pollen conditions.  I will also prescribe budesonide.  I recommended that she use budesonide or mometasone (Dulera) or fluticasone (Advair).  I can change to any of these that is covered by insurance.  Suggested using it during the times of the year when she is more likely to be triggered to to asthma flare.    Orders:    albuterol sulfate HFA (VENTOLIN HFA) 108 (90 Base) MCG/ACT inhaler; Inhale 2 puffs into the lungs 4 times daily as needed for Wheezing    budesonide-formoterol (SYMBICORT) 160-4.5 MCG/ACT AERO; Inhale 2 puffs into the lungs 2 times daily

## 2025-04-23 NOTE — PROGRESS NOTES
Kathy Johnson, was evaluated through a synchronous (real-time) audio-video encounter. The patient (or guardian if applicable) is aware that this is a billable service, which includes applicable co-pays. This Virtual Visit was conducted with patient's (and/or legal guardian's) consent. Patient identification was verified, and a caregiver was present when appropriate.   The patient was located at Home: [address] [place] [address].  [place] [address]  Provider was located at Facility (Appt Dept): [address] [place] [address]  Confirm you are appropriately licensed, registered, or certified to deliver care in the state where the patient is located as indicated above. If you are not or unsure, please re-schedule the visit: Yes, I confirm.     Kathy Johnson (:  1995) is a Established patient, presenting virtually for evaluation of the following:      Below is the assessment and plan developed based on review of pertinent history, physical exam, labs, studies, and medications.     Assessment & Plan  Mild persistent asthma with acute exacerbation  I will put out refills of albuterol for her asthma that is exacerbated by possibly a cold and possibly current pollen conditions.  I will also prescribe budesonide.  I recommended that she use budesonide or mometasone (Dulera) or fluticasone (Advair).  I can change to any of these that is covered by insurance.  Suggested using it during the times of the year when she is more likely to be triggered to to asthma flare.    Orders:    albuterol sulfate HFA (VENTOLIN HFA) 108 (90 Base) MCG/ACT inhaler; Inhale 2 puffs into the lungs 4 times daily as needed for Wheezing    budesonide-formoterol (SYMBICORT) 160-4.5 MCG/ACT AERO; Inhale 2 puffs into the lungs 2 times daily    Attention deficit hyperactivity disorder (ADHD), predominantly inattentive type  I will provide a letter stating that she does have attention deficit disorder and may need some extra considerations.

## 2025-04-23 NOTE — ASSESSMENT & PLAN NOTE
I will provide a letter stating that she does have attention deficit disorder and may need some extra considerations.

## 2025-05-05 ENCOUNTER — HOSPITAL ENCOUNTER (EMERGENCY)
Age: 30
Discharge: HOME | End: 2025-05-05
Attending: EMERGENCY MEDICINE
Payer: COMMERCIAL

## 2025-05-05 VITALS
TEMPERATURE: 97.6 F | OXYGEN SATURATION: 100 % | DIASTOLIC BLOOD PRESSURE: 89 MMHG | HEART RATE: 82 BPM | RESPIRATION RATE: 18 BRPM | SYSTOLIC BLOOD PRESSURE: 148 MMHG | WEIGHT: 212 LBS | BODY MASS INDEX: 42.74 KG/M2 | HEIGHT: 59 IN

## 2025-05-05 DIAGNOSIS — N93.9 VAGINAL BLEEDING: ICD-10-CM

## 2025-05-05 DIAGNOSIS — Z32.02 NEGATIVE PREGNANCY TEST: ICD-10-CM

## 2025-05-05 DIAGNOSIS — R03.0 ELEVATED BLOOD PRESSURE READING: ICD-10-CM

## 2025-05-05 DIAGNOSIS — R51.9 ACUTE NONINTRACTABLE HEADACHE, UNSPECIFIED HEADACHE TYPE: Primary | ICD-10-CM

## 2025-05-05 PROCEDURE — 6370000000 HC RX 637 (ALT 250 FOR IP): Performed by: EMERGENCY MEDICINE

## 2025-05-05 PROCEDURE — 99283 EMERGENCY DEPT VISIT LOW MDM: CPT

## 2025-05-05 RX ORDER — METOCLOPRAMIDE 10 MG/1
10 TABLET ORAL 3 TIMES DAILY PRN
Qty: 30 TABLET | Refills: 0 | Status: SHIPPED | OUTPATIENT
Start: 2025-05-05

## 2025-05-05 RX ORDER — IBUPROFEN 400 MG/1
800 TABLET, FILM COATED ORAL ONCE
Status: COMPLETED | OUTPATIENT
Start: 2025-05-05 | End: 2025-05-05

## 2025-05-05 RX ORDER — DIPHENHYDRAMINE HCL 25 MG
25 TABLET ORAL ONCE
Status: COMPLETED | OUTPATIENT
Start: 2025-05-05 | End: 2025-05-05

## 2025-05-05 RX ORDER — METOCLOPRAMIDE 10 MG/1
10 TABLET ORAL 3 TIMES DAILY
Qty: 30 TABLET | Refills: 0 | Status: SHIPPED | OUTPATIENT
Start: 2025-05-05 | End: 2025-05-05

## 2025-05-05 RX ORDER — METOCLOPRAMIDE 10 MG/1
10 TABLET ORAL ONCE
Status: COMPLETED | OUTPATIENT
Start: 2025-05-05 | End: 2025-05-05

## 2025-05-05 RX ORDER — ACETAMINOPHEN 500 MG
1000 TABLET ORAL ONCE
Status: COMPLETED | OUTPATIENT
Start: 2025-05-05 | End: 2025-05-05

## 2025-05-05 RX ORDER — NEBULIZER AND COMPRESSOR
1 EACH MISCELLANEOUS 2 TIMES DAILY
Qty: 1 KIT | Refills: 0 | Status: SHIPPED | OUTPATIENT
Start: 2025-05-05

## 2025-05-05 RX ADMIN — IBUPROFEN 800 MG: 400 TABLET, FILM COATED ORAL at 21:38

## 2025-05-05 RX ADMIN — ACETAMINOPHEN 1000 MG: 500 TABLET ORAL at 21:38

## 2025-05-05 RX ADMIN — DIPHENHYDRAMINE HYDROCHLORIDE 25 MG: 25 TABLET ORAL at 21:38

## 2025-05-05 RX ADMIN — METOCLOPRAMIDE 10 MG: 10 TABLET ORAL at 21:38

## 2025-05-05 ASSESSMENT — PAIN SCALES - GENERAL: PAINLEVEL_OUTOF10: 4

## 2025-05-05 ASSESSMENT — PAIN - FUNCTIONAL ASSESSMENT
PAIN_FUNCTIONAL_ASSESSMENT: 0-10
PAIN_FUNCTIONAL_ASSESSMENT: ACTIVITIES ARE NOT PREVENTED
PAIN_FUNCTIONAL_ASSESSMENT: NONE - DENIES PAIN

## 2025-05-05 ASSESSMENT — PAIN DESCRIPTION - DESCRIPTORS: DESCRIPTORS: ACHING

## 2025-05-05 ASSESSMENT — PAIN DESCRIPTION - PAIN TYPE: TYPE: ACUTE PAIN

## 2025-05-05 ASSESSMENT — LIFESTYLE VARIABLES
HOW MANY STANDARD DRINKS CONTAINING ALCOHOL DO YOU HAVE ON A TYPICAL DAY: PATIENT DOES NOT DRINK
HOW OFTEN DO YOU HAVE A DRINK CONTAINING ALCOHOL: NEVER

## 2025-05-05 ASSESSMENT — PAIN DESCRIPTION - LOCATION: LOCATION: HEAD

## 2025-05-06 NOTE — ED PROVIDER NOTES
PM        CONTINUE these medications which have NOT CHANGED    Details   albuterol sulfate HFA (VENTOLIN HFA) 108 (90 Base) MCG/ACT inhaler Inhale 2 puffs into the lungs 4 times daily as needed for Wheezing, Disp-18 g, R-5Normal      budesonide-formoterol (SYMBICORT) 160-4.5 MCG/ACT AERO Inhale 2 puffs into the lungs 2 times daily, Disp-10.2 g, R-3Can change to name brand if needed for coverage.Normal      lisdexamfetamine (VYVANSE) 30 MG capsule Take 1 capsule by mouth daily for 30 days. Max Daily Amount: 30 mg, Disp-30 capsule, R-0Normal      lisdexamfetamine (VYVANSE) 30 MG capsule Take 1 capsule by mouth daily for 30 days. Max Daily Amount: 30 mg, Disp-30 capsule, R-0Normal      lisdexamfetamine (VYVANSE) 30 MG capsule Take 1 capsule by mouth daily for 30 days. Max Daily Amount: 30 mg, Disp-30 capsule, R-0Normal      naltrexone (DEPADE) 50 MG tablet Take 0.5-1 tablets by mouth daily, Disp-30 tablet, R-1Normal      ferrous sulfate (IRON 325) 325 (65 Fe) MG tablet Take 1 tablet by mouth 2 times daily (with meals), Disp-60 tablet, R-5Normal      folic acid (FOLVITE) 1 MG tablet Take 1 tablet by mouth daily, Disp-90 tablet, R-3Normal      Cholecalciferol (VITAMIN D3) 50 MCG (2000 UT) TABS Take 1 tablet by mouth daily, Disp-90 tablet, R-3Normal      mometasone-formoterol (DULERA) 200-5 MCG/ACT inhaler Inhale 2 puffs into the lungs in the morning and 2 puffs in the evening., Disp-1 each, R-4Normal      hydrOXYzine pamoate (VISTARIL) 25 MG capsule Take 1 capsule by mouth 4 times daily as needed for Anxiety, Disp-60 capsule, R-0Normal      hydrOXYzine HCl (ATARAX) 25 MG tablet Take 1 tablet by mouth every 8 hours as needed for Itching, Disp-60 tablet, R-0Normal      amphetamine-dextroamphetamine (ADDERALL, 10MG,) 10 MG tablet Take 1 tablet by mouth 2 times daily for 30 days. Max Daily Amount: 20 mg, Disp-60 tablet, R-0Normal      albuterol (PROVENTIL) (2.5 MG/3ML) 0.083% nebulizer solution Take 3 mLs by nebulization